# Patient Record
Sex: FEMALE | Race: WHITE | NOT HISPANIC OR LATINO | Employment: UNEMPLOYED | URBAN - METROPOLITAN AREA
[De-identification: names, ages, dates, MRNs, and addresses within clinical notes are randomized per-mention and may not be internally consistent; named-entity substitution may affect disease eponyms.]

---

## 2023-03-10 LAB
CALCIDIOL (25 OH VITAMIN D3) (NG/ML) IN SER/PLAS: 23 NG/ML
COBALAMIN (VITAMIN B12) (PG/ML) IN SER/PLAS: 325 PG/ML (ref 211–911)
THYROTROPIN (MIU/L) IN SER/PLAS BY DETECTION LIMIT <= 0.05 MIU/L: 0.81 MIU/L (ref 0.44–3.98)

## 2023-10-02 RX ORDER — ERGOCALCIFEROL 1.25 MG/1
1 CAPSULE ORAL
Qty: 12 CAPSULE | OUTPATIENT
Start: 2023-10-02

## 2023-10-02 NOTE — TELEPHONE ENCOUNTER
Please call patient and ask her to schedule a follow up with me if she wants the vitamin d refilled. Thanks.

## 2023-10-03 PROBLEM — N92.0 MENORRHAGIA WITH REGULAR CYCLE: Status: ACTIVE | Noted: 2023-10-03

## 2023-10-03 PROBLEM — R23.2 HOT FLASHES: Status: ACTIVE | Noted: 2023-10-03

## 2023-10-03 PROBLEM — S62.638A CLOSED FRACTURE OF DISTAL PHALANX OF LITTLE FINGER: Status: ACTIVE | Noted: 2023-10-03

## 2023-10-03 PROBLEM — M51.369 DDD (DEGENERATIVE DISC DISEASE), LUMBAR: Status: ACTIVE | Noted: 2023-10-03

## 2023-10-03 PROBLEM — E55.9 VITAMIN D DEFICIENCY: Status: ACTIVE | Noted: 2023-10-03

## 2023-10-03 PROBLEM — N93.9 ABNORMAL UTERINE BLEEDING: Status: ACTIVE | Noted: 2023-10-03

## 2023-10-03 PROBLEM — M54.2 PAIN OF CERVICAL SPINE: Status: ACTIVE | Noted: 2023-10-03

## 2023-10-03 PROBLEM — R10.2 FEMALE PELVIC PAIN: Status: ACTIVE | Noted: 2023-10-03

## 2023-10-03 PROBLEM — G47.33 OBSTRUCTIVE SLEEP APNEA: Status: ACTIVE | Noted: 2023-10-03

## 2023-10-03 PROBLEM — M51.36 DDD (DEGENERATIVE DISC DISEASE), LUMBAR: Status: ACTIVE | Noted: 2023-10-03

## 2023-10-03 PROBLEM — R61 CHRONIC NIGHT SWEATS: Status: ACTIVE | Noted: 2023-10-03

## 2023-10-03 PROBLEM — R53.83 FATIGUE: Status: ACTIVE | Noted: 2023-10-03

## 2023-10-03 PROBLEM — M79.7 FIBROMYALGIA: Status: ACTIVE | Noted: 2023-10-03

## 2023-10-03 PROBLEM — N64.4 BREAST PAIN: Status: ACTIVE | Noted: 2023-10-03

## 2023-10-03 PROBLEM — R19.8 RIGHT PELVIC ADNEXAL FLUID COLLECTION: Status: ACTIVE | Noted: 2023-10-03

## 2023-10-03 PROBLEM — M54.50 LOW BACK PAIN: Status: ACTIVE | Noted: 2023-10-03

## 2023-10-03 PROBLEM — N92.6 IRREGULAR MENSTRUAL BLEEDING: Status: ACTIVE | Noted: 2023-10-03

## 2023-10-03 PROBLEM — E53.8 VITAMIN B12 DEFICIENCY: Status: ACTIVE | Noted: 2023-10-03

## 2023-10-03 PROBLEM — D48.5 NEOPLASM OF UNCERTAIN BEHAVIOR OF SKIN: Status: ACTIVE | Noted: 2020-01-14

## 2023-10-03 RX ORDER — FLUOXETINE HYDROCHLORIDE 20 MG/1
CAPSULE ORAL
COMMUNITY
Start: 2020-08-28 | End: 2023-12-12 | Stop reason: WASHOUT

## 2023-10-03 RX ORDER — FLUTICASONE PROPIONATE AND SALMETEROL 50; 250 UG/1; UG/1
POWDER RESPIRATORY (INHALATION)
COMMUNITY
Start: 2023-09-01 | End: 2023-12-12 | Stop reason: WASHOUT

## 2023-10-03 RX ORDER — FAMOTIDINE 40 MG/1
TABLET, FILM COATED ORAL
COMMUNITY
Start: 2019-11-05

## 2023-10-03 RX ORDER — LANOLIN ALCOHOL/MO/W.PET/CERES
CREAM (GRAM) TOPICAL
COMMUNITY
Start: 2023-03-04 | End: 2023-12-12 | Stop reason: WASHOUT

## 2023-10-03 RX ORDER — TERCONAZOLE 4 MG/G
CREAM VAGINAL
COMMUNITY
Start: 2020-08-28 | End: 2023-12-12 | Stop reason: WASHOUT

## 2023-10-03 RX ORDER — GABAPENTIN 300 MG/1
300 CAPSULE ORAL 3 TIMES DAILY
COMMUNITY
End: 2023-12-12 | Stop reason: SDUPTHER

## 2023-10-03 RX ORDER — TOPIRAMATE 100 MG/1
100 TABLET, FILM COATED ORAL 2 TIMES DAILY
COMMUNITY

## 2023-10-03 RX ORDER — INHALER, ASSIST DEVICES
SPACER (EA) MISCELLANEOUS
COMMUNITY
Start: 2022-10-10

## 2023-10-03 RX ORDER — ALBUTEROL SULFATE 90 UG/1
2 AEROSOL, METERED RESPIRATORY (INHALATION) EVERY 4 HOURS PRN
COMMUNITY

## 2023-10-03 RX ORDER — LIDOCAINE 50 MG/G
PATCH TOPICAL
COMMUNITY
Start: 2023-05-18 | End: 2024-04-23 | Stop reason: SDUPTHER

## 2023-10-03 RX ORDER — IBUPROFEN 600 MG/1
600 TABLET ORAL EVERY 8 HOURS
COMMUNITY
Start: 2022-10-12 | End: 2023-11-28 | Stop reason: WASHOUT

## 2023-10-03 RX ORDER — TRIAMTERENE/HYDROCHLOROTHIAZID 37.5-25 MG
1 TABLET ORAL DAILY
COMMUNITY
Start: 2023-08-19 | End: 2023-12-12 | Stop reason: WASHOUT

## 2023-10-03 RX ORDER — ASCORBIC ACID 250 MG
250 TABLET ORAL DAILY
COMMUNITY
Start: 2023-09-21

## 2023-10-03 RX ORDER — POTASSIUM CHLORIDE 750 MG/1
10 TABLET, EXTENDED RELEASE ORAL DAILY
COMMUNITY
Start: 2022-12-09

## 2023-10-03 RX ORDER — LANCETS 30 GAUGE
EACH MISCELLANEOUS
COMMUNITY
Start: 2023-09-08

## 2023-10-03 RX ORDER — LANCETS 33 GAUGE
EACH MISCELLANEOUS
COMMUNITY
Start: 2023-09-08

## 2023-10-03 RX ORDER — ALBUTEROL SULFATE 0.83 MG/ML
SOLUTION RESPIRATORY (INHALATION)
COMMUNITY
Start: 2022-10-11 | End: 2023-12-12 | Stop reason: WASHOUT

## 2023-10-03 RX ORDER — ACETAMINOPHEN 500 MG
TABLET ORAL
COMMUNITY
Start: 2023-08-27

## 2023-10-03 RX ORDER — LIDOCAINE 4 G/100G
PATCH TOPICAL
COMMUNITY
Start: 2023-01-10

## 2023-10-03 RX ORDER — SYRINGE-NEEDLE,INSULIN,0.5 ML 28GX1/2"
600 SYRINGE, EMPTY DISPOSABLE MISCELLANEOUS 2 TIMES DAILY
COMMUNITY

## 2023-10-03 RX ORDER — METFORMIN HYDROCHLORIDE 1000 MG/1
TABLET ORAL
COMMUNITY
Start: 2023-09-26

## 2023-10-03 RX ORDER — SITAGLIPTIN 100 MG/1
100 TABLET, FILM COATED ORAL DAILY
COMMUNITY
Start: 2023-09-19

## 2023-10-03 RX ORDER — CARVEDILOL 12.5 MG/1
TABLET ORAL
COMMUNITY

## 2023-10-03 RX ORDER — BLOOD SUGAR DIAGNOSTIC
STRIP MISCELLANEOUS
COMMUNITY
Start: 2023-09-08

## 2023-10-03 RX ORDER — MAGNESIUM 200 MG
1 TABLET ORAL DAILY
COMMUNITY
Start: 2023-09-30 | End: 2023-11-09

## 2023-10-03 RX ORDER — ASPIRIN 325 MG
TABLET, DELAYED RELEASE (ENTERIC COATED) ORAL
COMMUNITY

## 2023-10-03 RX ORDER — ONDANSETRON 4 MG/1
4 TABLET, ORALLY DISINTEGRATING ORAL EVERY 6 HOURS PRN
COMMUNITY
Start: 2023-08-27

## 2023-10-03 RX ORDER — DICLOFENAC SODIUM 75 MG/1
TABLET, DELAYED RELEASE ORAL
COMMUNITY
Start: 2019-08-12 | End: 2023-11-28 | Stop reason: WASHOUT

## 2023-10-03 RX ORDER — TOPIRAMATE 50 MG/1
TABLET, FILM COATED ORAL
COMMUNITY
End: 2023-12-12 | Stop reason: WASHOUT

## 2023-10-03 RX ORDER — CYCLOBENZAPRINE HCL 10 MG
TABLET ORAL
COMMUNITY
Start: 2023-04-16

## 2023-10-03 RX ORDER — DULOXETIN HYDROCHLORIDE 60 MG/1
CAPSULE, DELAYED RELEASE ORAL
COMMUNITY
Start: 2023-09-21

## 2023-10-03 RX ORDER — METOCLOPRAMIDE 10 MG/1
TABLET ORAL
COMMUNITY
Start: 2023-09-08

## 2023-10-03 RX ORDER — OMEPRAZOLE 40 MG/1
40 CAPSULE, DELAYED RELEASE ORAL 2 TIMES DAILY
COMMUNITY
Start: 2023-01-09

## 2023-10-03 RX ORDER — GLIPIZIDE 5 MG/1
5 TABLET ORAL DAILY
COMMUNITY
End: 2023-12-12 | Stop reason: WASHOUT

## 2023-10-03 RX ORDER — FERROUS SULFATE 325(65) MG
TABLET ORAL
COMMUNITY
Start: 2023-09-19

## 2023-10-03 RX ORDER — ERGOCALCIFEROL 1.25 MG/1
1 CAPSULE ORAL
COMMUNITY
Start: 2023-09-19 | End: 2023-11-09

## 2023-10-03 RX ORDER — TIOTROPIUM BROMIDE 18 UG/1
CAPSULE ORAL; RESPIRATORY (INHALATION)
COMMUNITY
Start: 2023-09-01

## 2023-10-03 RX ORDER — ONDANSETRON 4 MG/1
4 TABLET, FILM COATED ORAL 3 TIMES DAILY
COMMUNITY
Start: 2022-10-12 | End: 2023-12-12 | Stop reason: WASHOUT

## 2023-10-03 RX ORDER — DEXAMETHASONE 6 MG/1
6 TABLET ORAL DAILY
COMMUNITY
Start: 2023-08-30

## 2023-10-03 RX ORDER — PANTOPRAZOLE SODIUM 40 MG/1
40 TABLET, DELAYED RELEASE ORAL DAILY
COMMUNITY
Start: 2023-08-29 | End: 2023-12-12 | Stop reason: WASHOUT

## 2023-10-03 RX ORDER — PREGABALIN 25 MG/1
25 CAPSULE ORAL 3 TIMES DAILY
COMMUNITY
Start: 2023-01-10 | End: 2023-12-12 | Stop reason: WASHOUT

## 2023-10-03 RX ORDER — BENZONATATE 100 MG/1
CAPSULE ORAL
COMMUNITY
End: 2023-12-12 | Stop reason: WASHOUT

## 2023-10-03 RX ORDER — METFORMIN HYDROCHLORIDE 500 MG/1
TABLET ORAL
COMMUNITY
End: 2023-12-12 | Stop reason: WASHOUT

## 2023-10-03 RX ORDER — GABAPENTIN 100 MG/1
CAPSULE ORAL
COMMUNITY
End: 2023-12-12 | Stop reason: WASHOUT

## 2023-10-03 RX ORDER — ACETAMINOPHEN 325 MG/1
TABLET ORAL
COMMUNITY
Start: 2023-03-15 | End: 2023-12-12 | Stop reason: WASHOUT

## 2023-10-03 RX ORDER — FLUOXETINE HYDROCHLORIDE 40 MG/1
CAPSULE ORAL
COMMUNITY
Start: 2023-09-26

## 2023-10-04 ENCOUNTER — APPOINTMENT (OUTPATIENT)
Dept: INFUSION THERAPY | Facility: CLINIC | Age: 43
End: 2023-10-04
Payer: COMMERCIAL

## 2023-10-05 RX ORDER — METOPROLOL TARTRATE 25 MG/1
25 TABLET, FILM COATED ORAL ONCE
Status: CANCELLED | OUTPATIENT
Start: 2023-10-10 | End: 2023-10-10

## 2023-10-05 RX ORDER — EPINEPHRINE 0.3 MG/.3ML
0.3 INJECTION SUBCUTANEOUS EVERY 5 MIN PRN
Status: CANCELLED | OUTPATIENT
Start: 2023-10-10

## 2023-10-05 RX ORDER — NITROGLYCERIN 0.4 MG/1
0.4 TABLET SUBLINGUAL ONCE
Status: CANCELLED | OUTPATIENT
Start: 2023-10-10 | End: 2023-10-10

## 2023-10-05 RX ORDER — ALBUTEROL SULFATE 0.83 MG/ML
3 SOLUTION RESPIRATORY (INHALATION) AS NEEDED
Status: CANCELLED | OUTPATIENT
Start: 2023-10-10

## 2023-10-05 RX ORDER — ONDANSETRON HYDROCHLORIDE 2 MG/ML
4 INJECTION, SOLUTION INTRAVENOUS ONCE
Status: CANCELLED | OUTPATIENT
Start: 2023-10-10 | End: 2023-10-10

## 2023-10-05 RX ORDER — DIPHENHYDRAMINE HYDROCHLORIDE 50 MG/ML
50 INJECTION INTRAMUSCULAR; INTRAVENOUS AS NEEDED
Status: CANCELLED | OUTPATIENT
Start: 2023-10-10

## 2023-10-05 RX ORDER — KETOROLAC TROMETHAMINE 30 MG/ML
30 INJECTION, SOLUTION INTRAMUSCULAR; INTRAVENOUS ONCE
Status: CANCELLED | OUTPATIENT
Start: 2023-10-10 | End: 2023-10-10

## 2023-10-05 RX ORDER — FAMOTIDINE 10 MG/ML
20 INJECTION INTRAVENOUS ONCE AS NEEDED
Status: CANCELLED | OUTPATIENT
Start: 2023-10-10

## 2023-10-10 ENCOUNTER — INFUSION (OUTPATIENT)
Dept: INFUSION THERAPY | Facility: CLINIC | Age: 43
End: 2023-10-10
Payer: COMMERCIAL

## 2023-10-10 VITALS
SYSTOLIC BLOOD PRESSURE: 126 MMHG | RESPIRATION RATE: 16 BRPM | DIASTOLIC BLOOD PRESSURE: 79 MMHG | TEMPERATURE: 97.5 F | HEART RATE: 80 BPM | OXYGEN SATURATION: 97 %

## 2023-10-10 DIAGNOSIS — M79.7 FIBROMYALGIA: Primary | ICD-10-CM

## 2023-10-10 PROCEDURE — 2500000004 HC RX 250 GENERAL PHARMACY W/ HCPCS (ALT 636 FOR OP/ED): Performed by: NURSE PRACTITIONER

## 2023-10-10 PROCEDURE — 96365 THER/PROPH/DIAG IV INF INIT: CPT

## 2023-10-10 PROCEDURE — 96368 THER/DIAG CONCURRENT INF: CPT

## 2023-10-10 PROCEDURE — 2500000005 HC RX 250 GENERAL PHARMACY W/O HCPCS: Performed by: NURSE PRACTITIONER

## 2023-10-10 RX ORDER — ONDANSETRON HYDROCHLORIDE 2 MG/ML
4 INJECTION, SOLUTION INTRAVENOUS ONCE
Status: CANCELLED | OUTPATIENT
Start: 2023-10-24 | End: 2023-10-24

## 2023-10-10 RX ORDER — METOPROLOL TARTRATE 25 MG/1
25 TABLET, FILM COATED ORAL ONCE
Status: CANCELLED | OUTPATIENT
Start: 2023-10-24 | End: 2023-10-24

## 2023-10-10 RX ORDER — KETOROLAC TROMETHAMINE 30 MG/ML
30 INJECTION, SOLUTION INTRAMUSCULAR; INTRAVENOUS ONCE
Status: CANCELLED | OUTPATIENT
Start: 2023-10-24 | End: 2023-10-24

## 2023-10-10 RX ORDER — ALBUTEROL SULFATE 0.83 MG/ML
3 SOLUTION RESPIRATORY (INHALATION) AS NEEDED
Status: CANCELLED | OUTPATIENT
Start: 2023-10-24

## 2023-10-10 RX ORDER — EPINEPHRINE 0.3 MG/.3ML
0.3 INJECTION SUBCUTANEOUS EVERY 5 MIN PRN
Status: CANCELLED | OUTPATIENT
Start: 2023-10-24

## 2023-10-10 RX ORDER — FAMOTIDINE 10 MG/ML
20 INJECTION INTRAVENOUS ONCE AS NEEDED
Status: CANCELLED | OUTPATIENT
Start: 2023-10-24

## 2023-10-10 RX ORDER — NITROGLYCERIN 0.4 MG/1
0.4 TABLET SUBLINGUAL ONCE
Status: CANCELLED | OUTPATIENT
Start: 2023-10-24 | End: 2023-10-24

## 2023-10-10 RX ORDER — DIPHENHYDRAMINE HYDROCHLORIDE 50 MG/ML
50 INJECTION INTRAMUSCULAR; INTRAVENOUS AS NEEDED
Status: CANCELLED | OUTPATIENT
Start: 2023-10-24

## 2023-10-10 RX ADMIN — PROPOFOL 100 MG: 10 INJECTION, EMULSION INTRAVENOUS at 15:23

## 2023-10-10 RX ADMIN — Medication: at 15:23

## 2023-10-10 ASSESSMENT — PATIENT HEALTH QUESTIONNAIRE - PHQ9
10. IF YOU CHECKED OFF ANY PROBLEMS, HOW DIFFICULT HAVE THESE PROBLEMS MADE IT FOR YOU TO DO YOUR WORK, TAKE CARE OF THINGS AT HOME, OR GET ALONG WITH OTHER PEOPLE: VERY DIFFICULT
9. THOUGHTS THAT YOU WOULD BE BETTER OFF DEAD, OR OF HURTING YOURSELF: SEVERAL DAYS
SUM OF ALL RESPONSES TO PHQ QUESTIONS 1-9: 16
SUM OF ALL RESPONSES TO PHQ9 QUESTIONS 1 AND 2: 6
4. FEELING TIRED OR HAVING LITTLE ENERGY: SEVERAL DAYS
7. TROUBLE CONCENTRATING ON THINGS, SUCH AS READING THE NEWSPAPER OR WATCHING TELEVISION: NEARLY EVERY DAY
2. FEELING DOWN, DEPRESSED OR HOPELESS: NEARLY EVERY DAY
8. MOVING OR SPEAKING SO SLOWLY THAT OTHER PEOPLE COULD HAVE NOTICED. OR THE OPPOSITE, BEING SO FIGETY OR RESTLESS THAT YOU HAVE BEEN MOVING AROUND A LOT MORE THAN USUAL: SEVERAL DAYS
5. POOR APPETITE OR OVEREATING: SEVERAL DAYS
1. LITTLE INTEREST OR PLEASURE IN DOING THINGS: NEARLY EVERY DAY
6. FEELING BAD ABOUT YOURSELF - OR THAT YOU ARE A FAILURE OR HAVE LET YOURSELF OR YOUR FAMILY DOWN: MORE THAN HALF THE DAYS
3. TROUBLE FALLING OR STAYING ASLEEP OR SLEEPING TOO MUCH: SEVERAL DAYS

## 2023-10-10 ASSESSMENT — COLUMBIA-SUICIDE SEVERITY RATING SCALE - C-SSRS
6. HAVE YOU EVER DONE ANYTHING, STARTED TO DO ANYTHING, OR PREPARED TO DO ANYTHING TO END YOUR LIFE?: YES
1. IN THE PAST MONTH, HAVE YOU WISHED YOU WERE DEAD OR WISHED YOU COULD GO TO SLEEP AND NOT WAKE UP?: YES
6. HAVE YOU EVER DONE ANYTHING, STARTED TO DO ANYTHING, OR PREPARED TO DO ANYTHING TO END YOUR LIFE?: NO
2. HAVE YOU ACTUALLY HAD ANY THOUGHTS OF KILLING YOURSELF?: NO

## 2023-10-10 ASSESSMENT — PAIN - FUNCTIONAL ASSESSMENT: PAIN_FUNCTIONAL_ASSESSMENT: 0-10

## 2023-10-10 ASSESSMENT — PAIN SCALES - GENERAL
PAINLEVEL_OUTOF10: 0 - NO PAIN
PAINLEVEL_OUTOF10: 8

## 2023-10-10 NOTE — PROGRESS NOTES
S: Patient here for 8th opioid sparing pain infusion. Patient reports 80% reduction in pain after last infusion that lasted 24 hours.    Purpose of pain infusion meds explained along with potential side effects.  Patient verbalized understanding.    B: Pain Issues    A: Patient currently has pain described on flow sheet documentation. Designated  is significant other.     R: Plan; Obtain IV access, do patient risk assessment, and start opioid sparing infusion as ordered. Monitoring for S/S of adverse reactions.    1605- Post infusion teaching provided via handout and verbal instruction. Patient verbalized understanding. VSS, Patient states pain is 0. Will assist patient to waiting car via wheelchair.

## 2023-10-23 ENCOUNTER — APPOINTMENT (OUTPATIENT)
Dept: INFUSION THERAPY | Facility: CLINIC | Age: 43
End: 2023-10-23

## 2023-10-27 RX ORDER — ALBUTEROL SULFATE 0.83 MG/ML
3 SOLUTION RESPIRATORY (INHALATION) AS NEEDED
Status: CANCELLED | OUTPATIENT
Start: 2023-10-27

## 2023-10-27 RX ORDER — METOPROLOL TARTRATE 25 MG/1
25 TABLET, FILM COATED ORAL ONCE
Status: CANCELLED | OUTPATIENT
Start: 2023-10-27 | End: 2023-10-27

## 2023-10-27 RX ORDER — NITROGLYCERIN 0.4 MG/1
0.4 TABLET SUBLINGUAL ONCE
Status: CANCELLED | OUTPATIENT
Start: 2023-10-27 | End: 2023-10-27

## 2023-10-27 RX ORDER — DIPHENHYDRAMINE HYDROCHLORIDE 50 MG/ML
50 INJECTION INTRAMUSCULAR; INTRAVENOUS AS NEEDED
Status: CANCELLED | OUTPATIENT
Start: 2023-10-27

## 2023-10-27 RX ORDER — KETOROLAC TROMETHAMINE 30 MG/ML
30 INJECTION, SOLUTION INTRAMUSCULAR; INTRAVENOUS ONCE
Status: CANCELLED | OUTPATIENT
Start: 2023-10-27 | End: 2023-10-27

## 2023-10-27 RX ORDER — ONDANSETRON HYDROCHLORIDE 2 MG/ML
4 INJECTION, SOLUTION INTRAVENOUS ONCE
Status: CANCELLED | OUTPATIENT
Start: 2023-10-27 | End: 2023-10-27

## 2023-10-27 RX ORDER — FAMOTIDINE 10 MG/ML
20 INJECTION INTRAVENOUS ONCE AS NEEDED
Status: CANCELLED | OUTPATIENT
Start: 2023-10-27

## 2023-10-27 RX ORDER — EPINEPHRINE 0.3 MG/.3ML
0.3 INJECTION SUBCUTANEOUS EVERY 5 MIN PRN
Status: CANCELLED | OUTPATIENT
Start: 2023-10-27

## 2023-10-29 RX ORDER — HEPARIN 100 UNIT/ML
500 SYRINGE INTRAVENOUS AS NEEDED
OUTPATIENT
Start: 2023-10-30

## 2023-10-29 RX ORDER — HEPARIN SODIUM,PORCINE/PF 10 UNIT/ML
50 SYRINGE (ML) INTRAVENOUS AS NEEDED
OUTPATIENT
Start: 2023-10-30

## 2023-10-30 ENCOUNTER — INFUSION (OUTPATIENT)
Dept: INFUSION THERAPY | Facility: CLINIC | Age: 43
End: 2023-10-30
Payer: COMMERCIAL

## 2023-10-30 VITALS
OXYGEN SATURATION: 99 % | TEMPERATURE: 97.5 F | SYSTOLIC BLOOD PRESSURE: 120 MMHG | RESPIRATION RATE: 15 BRPM | HEART RATE: 81 BPM | DIASTOLIC BLOOD PRESSURE: 75 MMHG

## 2023-10-30 DIAGNOSIS — M79.7 FIBROMYALGIA: ICD-10-CM

## 2023-10-30 PROCEDURE — 96365 THER/PROPH/DIAG IV INF INIT: CPT | Performed by: ANESTHESIOLOGY

## 2023-10-30 PROCEDURE — 96375 TX/PRO/DX INJ NEW DRUG ADDON: CPT | Performed by: ANESTHESIOLOGY

## 2023-10-30 PROCEDURE — 2500000004 HC RX 250 GENERAL PHARMACY W/ HCPCS (ALT 636 FOR OP/ED): Performed by: ANESTHESIOLOGY

## 2023-10-30 PROCEDURE — 2500000005 HC RX 250 GENERAL PHARMACY W/O HCPCS: Performed by: ANESTHESIOLOGY

## 2023-10-30 RX ORDER — DIPHENHYDRAMINE HYDROCHLORIDE 50 MG/ML
50 INJECTION INTRAMUSCULAR; INTRAVENOUS AS NEEDED
Status: CANCELLED | OUTPATIENT
Start: 2023-11-12

## 2023-10-30 RX ORDER — KETOROLAC TROMETHAMINE 30 MG/ML
30 INJECTION, SOLUTION INTRAMUSCULAR; INTRAVENOUS ONCE
Status: CANCELLED | OUTPATIENT
Start: 2023-11-12 | End: 2023-11-12

## 2023-10-30 RX ORDER — NITROGLYCERIN 0.4 MG/1
0.4 TABLET SUBLINGUAL ONCE
Status: DISCONTINUED | OUTPATIENT
Start: 2023-10-30 | End: 2023-10-30 | Stop reason: HOSPADM

## 2023-10-30 RX ORDER — METOPROLOL TARTRATE 25 MG/1
25 TABLET, FILM COATED ORAL ONCE
Status: DISCONTINUED | OUTPATIENT
Start: 2023-10-30 | End: 2023-10-30 | Stop reason: HOSPADM

## 2023-10-30 RX ORDER — DIPHENHYDRAMINE HYDROCHLORIDE 50 MG/ML
50 INJECTION INTRAMUSCULAR; INTRAVENOUS AS NEEDED
Status: DISCONTINUED | OUTPATIENT
Start: 2023-10-30 | End: 2023-10-30 | Stop reason: HOSPADM

## 2023-10-30 RX ORDER — FAMOTIDINE 10 MG/ML
20 INJECTION INTRAVENOUS ONCE AS NEEDED
Status: CANCELLED | OUTPATIENT
Start: 2023-11-12

## 2023-10-30 RX ORDER — KETOROLAC TROMETHAMINE 30 MG/ML
30 INJECTION, SOLUTION INTRAMUSCULAR; INTRAVENOUS ONCE
Status: DISCONTINUED | OUTPATIENT
Start: 2023-10-30 | End: 2023-10-30

## 2023-10-30 RX ORDER — NITROGLYCERIN 0.4 MG/1
0.4 TABLET SUBLINGUAL ONCE
Status: CANCELLED | OUTPATIENT
Start: 2023-11-12 | End: 2023-11-12

## 2023-10-30 RX ORDER — METOPROLOL TARTRATE 25 MG/1
25 TABLET, FILM COATED ORAL ONCE
Status: CANCELLED | OUTPATIENT
Start: 2023-11-12 | End: 2023-11-12

## 2023-10-30 RX ORDER — FAMOTIDINE 10 MG/ML
20 INJECTION INTRAVENOUS ONCE AS NEEDED
Status: DISCONTINUED | OUTPATIENT
Start: 2023-10-30 | End: 2023-10-30 | Stop reason: HOSPADM

## 2023-10-30 RX ORDER — ONDANSETRON HYDROCHLORIDE 2 MG/ML
4 INJECTION, SOLUTION INTRAVENOUS ONCE
Status: DISCONTINUED | OUTPATIENT
Start: 2023-10-30 | End: 2023-10-30 | Stop reason: HOSPADM

## 2023-10-30 RX ORDER — EPINEPHRINE 0.3 MG/.3ML
0.3 INJECTION SUBCUTANEOUS EVERY 5 MIN PRN
Status: DISCONTINUED | OUTPATIENT
Start: 2023-10-30 | End: 2023-10-30 | Stop reason: HOSPADM

## 2023-10-30 RX ORDER — EPINEPHRINE 0.3 MG/.3ML
0.3 INJECTION SUBCUTANEOUS EVERY 5 MIN PRN
Status: CANCELLED | OUTPATIENT
Start: 2023-11-12

## 2023-10-30 RX ORDER — ALBUTEROL SULFATE 0.83 MG/ML
3 SOLUTION RESPIRATORY (INHALATION) AS NEEDED
Status: CANCELLED | OUTPATIENT
Start: 2023-11-12

## 2023-10-30 RX ORDER — ONDANSETRON HYDROCHLORIDE 2 MG/ML
4 INJECTION, SOLUTION INTRAVENOUS ONCE
Status: CANCELLED | OUTPATIENT
Start: 2023-11-12 | End: 2023-11-12

## 2023-10-30 RX ORDER — ALBUTEROL SULFATE 0.83 MG/ML
3 SOLUTION RESPIRATORY (INHALATION) AS NEEDED
Status: DISCONTINUED | OUTPATIENT
Start: 2023-10-30 | End: 2023-10-30 | Stop reason: HOSPADM

## 2023-10-30 RX ADMIN — Medication: at 13:30

## 2023-10-30 RX ADMIN — PROPOFOL 100 MG: 10 INJECTION, EMULSION INTRAVENOUS at 13:29

## 2023-10-30 SDOH — ECONOMIC STABILITY: FOOD INSECURITY: WITHIN THE PAST 12 MONTHS, YOU WORRIED THAT YOUR FOOD WOULD RUN OUT BEFORE YOU GOT MONEY TO BUY MORE.: NEVER TRUE

## 2023-10-30 SDOH — ECONOMIC STABILITY: FOOD INSECURITY: WITHIN THE PAST 12 MONTHS, THE FOOD YOU BOUGHT JUST DIDN'T LAST AND YOU DIDN'T HAVE MONEY TO GET MORE.: NEVER TRUE

## 2023-10-30 ASSESSMENT — LIFESTYLE VARIABLES: HOW OFTEN DO YOU HAVE A DRINK CONTAINING ALCOHOL: NEVER

## 2023-10-30 ASSESSMENT — PAIN - FUNCTIONAL ASSESSMENT
PAIN_FUNCTIONAL_ASSESSMENT: 0-10
PAIN_FUNCTIONAL_ASSESSMENT: 0-10

## 2023-10-30 ASSESSMENT — COLUMBIA-SUICIDE SEVERITY RATING SCALE - C-SSRS
2. HAVE YOU ACTUALLY HAD ANY THOUGHTS OF KILLING YOURSELF?: NO
1. IN THE PAST MONTH, HAVE YOU WISHED YOU WERE DEAD OR WISHED YOU COULD GO TO SLEEP AND NOT WAKE UP?: NO
6. HAVE YOU EVER DONE ANYTHING, STARTED TO DO ANYTHING, OR PREPARED TO DO ANYTHING TO END YOUR LIFE?: NO
6. HAVE YOU EVER DONE ANYTHING, STARTED TO DO ANYTHING, OR PREPARED TO DO ANYTHING TO END YOUR LIFE?: NO

## 2023-10-30 ASSESSMENT — PATIENT HEALTH QUESTIONNAIRE - PHQ9
2. FEELING DOWN, DEPRESSED OR HOPELESS: SEVERAL DAYS
5. POOR APPETITE OR OVEREATING: SEVERAL DAYS
3. TROUBLE FALLING OR STAYING ASLEEP OR SLEEPING TOO MUCH: SEVERAL DAYS
6. FEELING BAD ABOUT YOURSELF - OR THAT YOU ARE A FAILURE OR HAVE LET YOURSELF OR YOUR FAMILY DOWN: SEVERAL DAYS
SUM OF ALL RESPONSES TO PHQ9 QUESTIONS 1 AND 2: 2
8. MOVING OR SPEAKING SO SLOWLY THAT OTHER PEOPLE COULD HAVE NOTICED. OR THE OPPOSITE, BEING SO FIGETY OR RESTLESS THAT YOU HAVE BEEN MOVING AROUND A LOT MORE THAN USUAL: SEVERAL DAYS
4. FEELING TIRED OR HAVING LITTLE ENERGY: SEVERAL DAYS
1. LITTLE INTEREST OR PLEASURE IN DOING THINGS: SEVERAL DAYS
9. THOUGHTS THAT YOU WOULD BE BETTER OFF DEAD, OR OF HURTING YOURSELF: SEVERAL DAYS
SUM OF ALL RESPONSES TO PHQ QUESTIONS 1-9: 9
7. TROUBLE CONCENTRATING ON THINGS, SUCH AS READING THE NEWSPAPER OR WATCHING TELEVISION: SEVERAL DAYS

## 2023-10-30 ASSESSMENT — PAIN SCALES - GENERAL
PAINLEVEL_OUTOF10: 0 - NO PAIN
PAINLEVEL_OUTOF10: 0 - NO PAIN
PAINLEVEL_OUTOF10: 8
PAINLEVEL: 8
PAINLEVEL_OUTOF10: 8

## 2023-10-30 ASSESSMENT — ENCOUNTER SYMPTOMS
OCCASIONAL FEELINGS OF UNSTEADINESS: 0
LOSS OF SENSATION IN FEET: 0
DEPRESSION: 0

## 2023-10-30 NOTE — PROGRESS NOTES
S: Patient here for 10 opioid sparing pain infusion. Patient reports 80% reduction in pain after last infusion that lasted 24 hours.    Purpose of pain infusion meds explained along with potential side effects.  Patient verbalized understanding.    B: Pain Issues 8/10 low right back    A: Patient currently has pain described on flow sheet documentation. Designated  is [employer]. Patient last ate solid food 4 hours ago, and had liquid 1 hours ago.    R: Plan; Obtain IV access, do patient risk assessment, and start opioid sparing infusion as ordered. Monitoring for S/S of adverse reactions.    Patient resting up in chair, no distress noted.    Post infusion teaching provided via handout and verbal instruction. Patient verbalized understanding. VSS, Patient states pain is 0. Will assist patient to waiting car via wheelchair.      Patient tolerated well.

## 2023-11-07 DIAGNOSIS — E53.8 VITAMIN B12 DEFICIENCY: Primary | ICD-10-CM

## 2023-11-07 DIAGNOSIS — E55.9 VITAMIN D DEFICIENCY: ICD-10-CM

## 2023-11-09 RX ORDER — MAGNESIUM 200 MG
TABLET ORAL
Qty: 90 TABLET | Refills: 0 | Status: SHIPPED | OUTPATIENT
Start: 2023-11-09 | End: 2023-11-28 | Stop reason: SDUPTHER

## 2023-11-09 RX ORDER — ERGOCALCIFEROL 1.25 MG/1
1 CAPSULE ORAL
Qty: 12 CAPSULE | Refills: 0 | Status: SHIPPED | OUTPATIENT
Start: 2023-11-09 | End: 2023-11-28 | Stop reason: WASHOUT

## 2023-11-13 RX ORDER — ONDANSETRON HYDROCHLORIDE 2 MG/ML
4 INJECTION, SOLUTION INTRAVENOUS ONCE
Status: CANCELLED | OUTPATIENT
Start: 2023-11-14 | End: 2023-11-14

## 2023-11-13 RX ORDER — EPINEPHRINE 0.3 MG/.3ML
0.3 INJECTION SUBCUTANEOUS EVERY 5 MIN PRN
Status: CANCELLED | OUTPATIENT
Start: 2023-11-14

## 2023-11-13 RX ORDER — METOPROLOL TARTRATE 25 MG/1
25 TABLET, FILM COATED ORAL ONCE
Status: CANCELLED | OUTPATIENT
Start: 2023-11-14 | End: 2023-11-14

## 2023-11-13 RX ORDER — ALBUTEROL SULFATE 0.83 MG/ML
3 SOLUTION RESPIRATORY (INHALATION) AS NEEDED
Status: CANCELLED | OUTPATIENT
Start: 2023-11-14

## 2023-11-13 RX ORDER — DIPHENHYDRAMINE HYDROCHLORIDE 50 MG/ML
50 INJECTION INTRAMUSCULAR; INTRAVENOUS AS NEEDED
Status: CANCELLED | OUTPATIENT
Start: 2023-11-14

## 2023-11-13 RX ORDER — FAMOTIDINE 10 MG/ML
20 INJECTION INTRAVENOUS ONCE AS NEEDED
Status: CANCELLED | OUTPATIENT
Start: 2023-11-14

## 2023-11-13 RX ORDER — KETOROLAC TROMETHAMINE 30 MG/ML
30 INJECTION, SOLUTION INTRAMUSCULAR; INTRAVENOUS ONCE
Status: CANCELLED | OUTPATIENT
Start: 2023-11-14 | End: 2023-11-14

## 2023-11-13 RX ORDER — NITROGLYCERIN 0.4 MG/1
0.4 TABLET SUBLINGUAL ONCE
Status: CANCELLED | OUTPATIENT
Start: 2023-11-14 | End: 2023-11-14

## 2023-11-14 ENCOUNTER — APPOINTMENT (OUTPATIENT)
Dept: INFUSION THERAPY | Facility: CLINIC | Age: 43
End: 2023-11-14
Payer: COMMERCIAL

## 2023-11-28 ENCOUNTER — TELEMEDICINE (OUTPATIENT)
Dept: INTEGRATIVE MEDICINE | Facility: CLINIC | Age: 43
End: 2023-11-28
Payer: COMMERCIAL

## 2023-11-28 VITALS — BODY MASS INDEX: 38.57 KG/M2 | HEIGHT: 66 IN | WEIGHT: 240 LBS

## 2023-11-28 DIAGNOSIS — M54.41 CHRONIC BILATERAL LOW BACK PAIN WITH BILATERAL SCIATICA: ICD-10-CM

## 2023-11-28 DIAGNOSIS — E53.8 VITAMIN B12 DEFICIENCY: ICD-10-CM

## 2023-11-28 DIAGNOSIS — G89.29 CHRONIC BILATERAL LOW BACK PAIN WITH BILATERAL SCIATICA: ICD-10-CM

## 2023-11-28 DIAGNOSIS — E55.9 VITAMIN D DEFICIENCY: Primary | ICD-10-CM

## 2023-11-28 DIAGNOSIS — N92.0 MENORRHAGIA WITH REGULAR CYCLE: ICD-10-CM

## 2023-11-28 DIAGNOSIS — R53.82 CHRONIC FATIGUE: ICD-10-CM

## 2023-11-28 DIAGNOSIS — G35 MULTIPLE SCLEROSIS (MULTI): ICD-10-CM

## 2023-11-28 DIAGNOSIS — M54.42 CHRONIC BILATERAL LOW BACK PAIN WITH BILATERAL SCIATICA: ICD-10-CM

## 2023-11-28 PROCEDURE — 99215 OFFICE O/P EST HI 40 MIN: CPT | Performed by: INTERNAL MEDICINE

## 2023-11-28 RX ORDER — ERGOCALCIFEROL 1.25 MG/1
50000 CAPSULE ORAL
Qty: 12 CAPSULE | Refills: 0 | Status: SHIPPED | OUTPATIENT
Start: 2023-11-28 | End: 2024-02-20

## 2023-11-28 RX ORDER — MAGNESIUM 200 MG
1000 TABLET ORAL DAILY
Qty: 90 TABLET | Refills: 0 | Status: SHIPPED | OUTPATIENT
Start: 2023-11-28

## 2023-11-28 ASSESSMENT — ENCOUNTER SYMPTOMS
WEAKNESS: 0
SHORTNESS OF BREATH: 0
FATIGUE: 0
ARTHRALGIAS: 0
COUGH: 0
HEADACHES: 0
FEVER: 0
MYALGIAS: 0
NERVOUS/ANXIOUS: 0
BACK PAIN: 1
SLEEP DISTURBANCE: 0
DYSURIA: 0
DIFFICULTY URINATING: 0
APPETITE CHANGE: 0

## 2023-11-28 NOTE — ASSESSMENT & PLAN NOTE
Has iron deficiency and not on iron. Suggest start iron.   Suggest follow up with pcp about this.

## 2023-11-28 NOTE — PATIENT INSTRUCTIONS
Restart vitamin d and vitamin b12.  Restart iron supplements and see pcp in next couple months to review how long to be on these given your iron deficiency.   Start physical therapy.  Follow up in two months.   Get blood work in one month for vitamin d and vitamin b12.   Junie Tobar MD PhD

## 2023-11-28 NOTE — PROGRESS NOTES
"Integrative Medicine Follow-up Visit :     Subjective   Patient ID: Iraida La is a 43 y.o. female who presents for Anemia and Back Pain       HPI  Back pain: hurts to walk.  Leg weakness. She did not go to PT as we discussed lately.   Is anemic. She was on iron supplements but no one prescribed refills.       Hx of multiple sclerosis.  Has some narrowing of her spine per patient but she does not know exactly why she has low back pain. Maybe a slipped disk.     Is using cpap to help with her difficulty sleeping.    Was on doxycycline for stomach issues but off this now.   Pain:9/10 pain today. Located in her low back.     Review of Systems   Constitutional:  Negative for appetite change, fatigue and fever.   HENT:  Negative for congestion and hearing loss.    Eyes:  Negative for visual disturbance.   Respiratory:  Negative for cough and shortness of breath.    Cardiovascular:  Negative for chest pain and leg swelling.   Genitourinary:  Positive for menstrual problem (heavy and painful periods). Negative for difficulty urinating, dysuria and urgency.   Musculoskeletal:  Positive for back pain. Negative for arthralgias and myalgias.   Skin:  Negative for rash.   Neurological:  Negative for weakness and headaches.   Psychiatric/Behavioral:  Negative for behavioral problems and sleep disturbance. The patient is not nervous/anxious.                   Objective   Ht 1.676 m (5' 6\")   Wt 109 kg (240 lb)   BMI 38.74 kg/m²     Physical Exam  Constitutional:       General: She is not in acute distress.     Appearance: She is not ill-appearing, toxic-appearing or diaphoretic.   Pulmonary:      Effort: Pulmonary effort is normal. No respiratory distress.   Musculoskeletal:         General: No deformity.      Cervical back: Normal range of motion.      Comments: Upper extremities normal range of motion grossly   Skin:     Coloration: Skin is not jaundiced or pale.      Findings: No rash.   Psychiatric:         Mood and " Affect: Mood normal.         Behavior: Behavior normal.                      Assessment/Plan     Problem List Items Addressed This Visit             ICD-10-CM    Low back pain M54.50     Recommend PT. Referral entered again.            Relevant Orders    Referral to Physical Therapy    Menorrhagia with regular cycle N92.0     Has iron deficiency and not on iron. Suggest start iron.   Suggest follow up with pcp about this.            Fatigue R53.83     Likely due to iron deficiency         Vitamin B12 deficiency E53.8     Continue b12 supplement.          Relevant Medications    cyanocobalamin, vitamin B-12, 1,000 mcg tablet, sublingual    Other Relevant Orders    Vitamin B12    Vitamin D deficiency - Primary E55.9     Continue supplementaiton         Relevant Medications    ergocalciferol (Vitamin D-2) 1.25 MG (41553 UT) capsule    Other Relevant Orders    Vitamin D 25-Hydroxy,Total (for eval of Vitamin D levels)     Other Visit Diagnoses         Codes    Multiple sclerosis (CMS/HCC)     G35    Relevant Orders    Referral to Physical Therapy              Recommend Follow up in : 3 months    Junie Tobar MD PhD    Time Spent  Prep time on day of patient encounter: 5 minutes  Time spent directly with patient, family or caregiver: 30 minutes  Additional Time Spent on Patient Care Activities: 0 minutes  Documentation Time: 7 minutes  Other Time Spent: 0 minutes  Total: 42 minutes

## 2023-11-30 RX ORDER — PROMETHAZINE HYDROCHLORIDE 25 MG/ML
12.5 INJECTION, SOLUTION INTRAMUSCULAR; INTRAVENOUS ONCE
Status: CANCELLED | OUTPATIENT
Start: 2023-12-05 | End: 2023-12-05

## 2023-11-30 RX ORDER — ALBUTEROL SULFATE 0.83 MG/ML
3 SOLUTION RESPIRATORY (INHALATION) AS NEEDED
Status: CANCELLED | OUTPATIENT
Start: 2023-12-05

## 2023-11-30 RX ORDER — DIPHENHYDRAMINE HYDROCHLORIDE 50 MG/ML
25 INJECTION INTRAMUSCULAR; INTRAVENOUS ONCE
Status: CANCELLED | OUTPATIENT
Start: 2023-12-05 | End: 2023-12-05

## 2023-11-30 RX ORDER — EPINEPHRINE 0.3 MG/.3ML
0.3 INJECTION SUBCUTANEOUS EVERY 5 MIN PRN
Status: CANCELLED | OUTPATIENT
Start: 2023-12-05

## 2023-11-30 RX ORDER — FAMOTIDINE 10 MG/ML
20 INJECTION INTRAVENOUS ONCE AS NEEDED
Status: CANCELLED | OUTPATIENT
Start: 2023-12-05

## 2023-11-30 RX ORDER — METOCLOPRAMIDE HYDROCHLORIDE 5 MG/ML
10 INJECTION INTRAMUSCULAR; INTRAVENOUS ONCE
Status: CANCELLED | OUTPATIENT
Start: 2023-12-05 | End: 2023-12-05

## 2023-11-30 RX ORDER — NITROGLYCERIN 0.4 MG/1
0.4 TABLET SUBLINGUAL ONCE
Status: CANCELLED | OUTPATIENT
Start: 2023-12-05 | End: 2023-12-05

## 2023-11-30 RX ORDER — METOPROLOL TARTRATE 25 MG/1
25 TABLET, FILM COATED ORAL ONCE
Status: CANCELLED | OUTPATIENT
Start: 2023-12-05 | End: 2023-12-05

## 2023-11-30 RX ORDER — DIPHENHYDRAMINE HYDROCHLORIDE 50 MG/ML
50 INJECTION INTRAMUSCULAR; INTRAVENOUS AS NEEDED
Status: CANCELLED | OUTPATIENT
Start: 2023-12-05

## 2023-11-30 RX ORDER — ONDANSETRON HYDROCHLORIDE 2 MG/ML
4 INJECTION, SOLUTION INTRAVENOUS ONCE
Status: CANCELLED | OUTPATIENT
Start: 2023-12-05 | End: 2023-12-05

## 2023-12-05 ENCOUNTER — INFUSION (OUTPATIENT)
Dept: INFUSION THERAPY | Facility: CLINIC | Age: 43
End: 2023-12-05
Payer: COMMERCIAL

## 2023-12-05 VITALS
RESPIRATION RATE: 14 BRPM | TEMPERATURE: 97.2 F | OXYGEN SATURATION: 100 % | SYSTOLIC BLOOD PRESSURE: 115 MMHG | HEART RATE: 77 BPM | DIASTOLIC BLOOD PRESSURE: 78 MMHG

## 2023-12-05 DIAGNOSIS — M79.7 FIBROMYALGIA: Primary | ICD-10-CM

## 2023-12-05 PROCEDURE — 96368 THER/DIAG CONCURRENT INF: CPT | Mod: INF

## 2023-12-05 PROCEDURE — 96365 THER/PROPH/DIAG IV INF INIT: CPT | Mod: INF

## 2023-12-05 PROCEDURE — 2500000005 HC RX 250 GENERAL PHARMACY W/O HCPCS: Performed by: NURSE PRACTITIONER

## 2023-12-05 PROCEDURE — 2500000004 HC RX 250 GENERAL PHARMACY W/ HCPCS (ALT 636 FOR OP/ED): Performed by: NURSE PRACTITIONER

## 2023-12-05 RX ORDER — METOCLOPRAMIDE HYDROCHLORIDE 5 MG/ML
10 INJECTION INTRAMUSCULAR; INTRAVENOUS ONCE
Status: CANCELLED | OUTPATIENT
Start: 2023-12-26 | End: 2023-12-26

## 2023-12-05 RX ORDER — ONDANSETRON HYDROCHLORIDE 2 MG/ML
4 INJECTION, SOLUTION INTRAVENOUS ONCE
Status: CANCELLED | OUTPATIENT
Start: 2023-12-26 | End: 2023-12-26

## 2023-12-05 RX ORDER — ALBUTEROL SULFATE 0.83 MG/ML
3 SOLUTION RESPIRATORY (INHALATION) AS NEEDED
Status: CANCELLED | OUTPATIENT
Start: 2023-12-26

## 2023-12-05 RX ORDER — DULAGLUTIDE 1.5 MG/.5ML
1.5 INJECTION, SOLUTION SUBCUTANEOUS
COMMUNITY

## 2023-12-05 RX ORDER — DIPHENHYDRAMINE HYDROCHLORIDE 50 MG/ML
25 INJECTION INTRAMUSCULAR; INTRAVENOUS ONCE
Status: CANCELLED | OUTPATIENT
Start: 2023-12-26 | End: 2023-12-26

## 2023-12-05 RX ORDER — METOPROLOL TARTRATE 25 MG/1
25 TABLET, FILM COATED ORAL ONCE
Status: CANCELLED | OUTPATIENT
Start: 2023-12-26 | End: 2023-12-26

## 2023-12-05 RX ORDER — DIPHENHYDRAMINE HYDROCHLORIDE 50 MG/ML
50 INJECTION INTRAMUSCULAR; INTRAVENOUS AS NEEDED
Status: CANCELLED | OUTPATIENT
Start: 2023-12-26

## 2023-12-05 RX ORDER — FAMOTIDINE 10 MG/ML
20 INJECTION INTRAVENOUS ONCE AS NEEDED
Status: CANCELLED | OUTPATIENT
Start: 2023-12-26

## 2023-12-05 RX ORDER — PROMETHAZINE HYDROCHLORIDE 25 MG/ML
12.5 INJECTION, SOLUTION INTRAMUSCULAR; INTRAVENOUS ONCE
Status: CANCELLED | OUTPATIENT
Start: 2023-12-26 | End: 2023-12-26

## 2023-12-05 RX ORDER — NITROGLYCERIN 0.4 MG/1
0.4 TABLET SUBLINGUAL ONCE
Status: CANCELLED | OUTPATIENT
Start: 2023-12-26 | End: 2023-12-26

## 2023-12-05 RX ORDER — EPINEPHRINE 0.3 MG/.3ML
0.3 INJECTION SUBCUTANEOUS EVERY 5 MIN PRN
Status: CANCELLED | OUTPATIENT
Start: 2023-12-26

## 2023-12-05 RX ADMIN — PROPOFOL 100 MG: 10 INJECTION, EMULSION INTRAVENOUS at 15:15

## 2023-12-05 RX ADMIN — Medication: at 15:15

## 2023-12-05 ASSESSMENT — PAIN SCALES - GENERAL
PAINLEVEL_OUTOF10: 7
PAINLEVEL_OUTOF10: 7
PAINLEVEL_OUTOF10: 0 - NO PAIN
PAINLEVEL_OUTOF10: 0 - NO PAIN
PAINLEVEL: 7

## 2023-12-05 ASSESSMENT — PAIN - FUNCTIONAL ASSESSMENT: PAIN_FUNCTIONAL_ASSESSMENT: 0-10

## 2023-12-05 NOTE — PROGRESS NOTES
Pt is here for her 10th pain infusion. Pt currently has 7/10 pain in her R lower back.  Pt reports 90% relief after previous infusion that lasted 1 day.  Pt's  is her fiance.  Pt had a light breakfast and fluids throughout the day.

## 2023-12-12 ENCOUNTER — OFFICE VISIT (OUTPATIENT)
Dept: PAIN MEDICINE | Facility: CLINIC | Age: 43
End: 2023-12-12
Payer: COMMERCIAL

## 2023-12-12 VITALS
OXYGEN SATURATION: 100 % | HEART RATE: 89 BPM | TEMPERATURE: 96.1 F | BODY MASS INDEX: 39.86 KG/M2 | DIASTOLIC BLOOD PRESSURE: 77 MMHG | RESPIRATION RATE: 20 BRPM | HEIGHT: 66 IN | SYSTOLIC BLOOD PRESSURE: 110 MMHG | WEIGHT: 248 LBS

## 2023-12-12 DIAGNOSIS — G89.29 CHRONIC BILATERAL LOW BACK PAIN WITH BILATERAL SCIATICA: ICD-10-CM

## 2023-12-12 DIAGNOSIS — M54.42 CHRONIC BILATERAL LOW BACK PAIN WITH BILATERAL SCIATICA: ICD-10-CM

## 2023-12-12 DIAGNOSIS — M54.41 CHRONIC BILATERAL LOW BACK PAIN WITH BILATERAL SCIATICA: ICD-10-CM

## 2023-12-12 DIAGNOSIS — M79.7 FIBROMYALGIA: Primary | ICD-10-CM

## 2023-12-12 PROCEDURE — 99214 OFFICE O/P EST MOD 30 MIN: CPT | Mod: ZK | Performed by: NURSE PRACTITIONER

## 2023-12-12 PROCEDURE — 99214 OFFICE O/P EST MOD 30 MIN: CPT | Performed by: NURSE PRACTITIONER

## 2023-12-12 PROCEDURE — 1036F TOBACCO NON-USER: CPT | Performed by: NURSE PRACTITIONER

## 2023-12-12 RX ORDER — FAMOTIDINE 10 MG/ML
20 INJECTION INTRAVENOUS ONCE AS NEEDED
Status: CANCELLED | OUTPATIENT
Start: 2023-12-19

## 2023-12-12 RX ORDER — PROMETHAZINE HYDROCHLORIDE 25 MG/ML
12.5 INJECTION, SOLUTION INTRAMUSCULAR; INTRAVENOUS ONCE
Status: CANCELLED | OUTPATIENT
Start: 2023-12-19 | End: 2023-12-19

## 2023-12-12 RX ORDER — DIPHENHYDRAMINE HYDROCHLORIDE 50 MG/ML
25 INJECTION INTRAMUSCULAR; INTRAVENOUS ONCE
Status: CANCELLED | OUTPATIENT
Start: 2023-12-19 | End: 2023-12-19

## 2023-12-12 RX ORDER — GABAPENTIN 300 MG/1
300 CAPSULE ORAL 3 TIMES DAILY
Qty: 150 CAPSULE | Refills: 0 | Status: SHIPPED | OUTPATIENT
Start: 2023-12-12 | End: 2024-01-04

## 2023-12-12 RX ORDER — EPINEPHRINE 0.3 MG/.3ML
0.3 INJECTION SUBCUTANEOUS EVERY 5 MIN PRN
Status: CANCELLED | OUTPATIENT
Start: 2023-12-19

## 2023-12-12 RX ORDER — ONDANSETRON HYDROCHLORIDE 2 MG/ML
4 INJECTION, SOLUTION INTRAVENOUS ONCE
Status: CANCELLED | OUTPATIENT
Start: 2023-12-19 | End: 2023-12-19

## 2023-12-12 RX ORDER — DIPHENHYDRAMINE HYDROCHLORIDE 50 MG/ML
50 INJECTION INTRAMUSCULAR; INTRAVENOUS AS NEEDED
Status: CANCELLED | OUTPATIENT
Start: 2023-12-19

## 2023-12-12 RX ORDER — FLUTICASONE PROPIONATE 50 MCG
2 SPRAY, SUSPENSION (ML) NASAL
COMMUNITY
Start: 2019-04-11

## 2023-12-12 RX ORDER — FLUTICASONE FUROATE AND VILANTEROL 100; 25 UG/1; UG/1
POWDER RESPIRATORY (INHALATION)
COMMUNITY

## 2023-12-12 RX ORDER — NITROGLYCERIN 0.4 MG/1
0.4 TABLET SUBLINGUAL ONCE
Status: CANCELLED | OUTPATIENT
Start: 2023-12-19 | End: 2023-12-19

## 2023-12-12 RX ORDER — METOCLOPRAMIDE HYDROCHLORIDE 5 MG/ML
10 INJECTION INTRAMUSCULAR; INTRAVENOUS ONCE
Status: CANCELLED | OUTPATIENT
Start: 2023-12-19 | End: 2023-12-19

## 2023-12-12 RX ORDER — METOPROLOL TARTRATE 25 MG/1
25 TABLET, FILM COATED ORAL ONCE
Status: CANCELLED | OUTPATIENT
Start: 2023-12-19 | End: 2023-12-19

## 2023-12-12 RX ORDER — MONTELUKAST SODIUM 10 MG/1
1 TABLET ORAL NIGHTLY
COMMUNITY
Start: 2019-11-05

## 2023-12-12 RX ORDER — ALBUTEROL SULFATE 0.83 MG/ML
3 SOLUTION RESPIRATORY (INHALATION) AS NEEDED
Status: CANCELLED | OUTPATIENT
Start: 2023-12-19

## 2023-12-12 ASSESSMENT — ENCOUNTER SYMPTOMS
DIARRHEA: 0
ABDOMINAL PAIN: 0
CHEST TIGHTNESS: 0
DIFFICULTY URINATING: 0
FREQUENCY: 0
VOMITING: 0
BACK PAIN: 1
WEAKNESS: 0
FEVER: 0
DIZZINESS: 0
CONFUSION: 0
FATIGUE: 0
NERVOUS/ANXIOUS: 0
MYALGIAS: 1
NECK PAIN: 1
SHORTNESS OF BREATH: 0
CHILLS: 0
HEADACHES: 0
NAUSEA: 0
WHEEZING: 0
PALPITATIONS: 0

## 2023-12-12 ASSESSMENT — PAIN - FUNCTIONAL ASSESSMENT: PAIN_FUNCTIONAL_ASSESSMENT: 0-10

## 2023-12-12 ASSESSMENT — PAIN SCALES - GENERAL
PAINLEVEL: 6
PAINLEVEL_OUTOF10: 6

## 2023-12-12 ASSESSMENT — PAIN DESCRIPTION - DESCRIPTORS: DESCRIPTORS: SHARP;SHOOTING;RADIATING

## 2023-12-12 NOTE — PROGRESS NOTES
Chief Complain  Follow-up visit for IV infusion therapy every 2 weeks for widespread fibromyalgia pain which provides 80% relief for roughly 2 to 3 days and symptoms taper to 60% for 3 to 4 days.    History Of Present Illness  Iraida La is a 43 y.o. female here for chronic widespread body pain radiating to left upper and lower extremities . The patient rates the pain at 6 on a scale from 0-10.  The patient describes pain as sharp, radiating, shooting.  The pain is worsened by bending forward, standing, prolonged sitting, walking, and lifting and is alleviated by medications Tylenol, prescribed pain medications, and IV infusion therapy, position change, sitting, lying down, and   .  Since the last visit the pain has improved.    The patient denies any fever, chills, weight loss, weakness, bladder/ bowel incontinence, history of cancer, history of IV drug abuse, recent trauma.     Past Medical History  She has no past medical history on file.    Surgical History  She has no past surgical history on file.     Social History  She reports that she has quit smoking. Her smoking use included cigarettes. She has been exposed to tobacco smoke. She has never used smokeless tobacco. She reports current alcohol use of about 1.0 standard drink of alcohol per week. She reports that she does not currently use drugs.    Family History  Family History   Problem Relation Name Age of Onset    No Known Problems Mother      No Known Problems Father          Allergies  Aspirin, Erythromycin, Salicylates, Toradol [ketorolac], Erythromycin base, and Peanut    Review of Systems  Review of Systems   Constitutional:  Negative for chills, fatigue and fever.   Respiratory:  Negative for chest tightness, shortness of breath and wheezing.    Cardiovascular:  Negative for chest pain and palpitations.   Gastrointestinal:  Negative for abdominal pain, diarrhea, nausea and vomiting.   Genitourinary:  Negative for difficulty urinating and  "frequency.   Musculoskeletal:  Positive for back pain, myalgias and neck pain.        Wide spread body aches/joint pain   Neurological:  Negative for dizziness, weakness and headaches.   Psychiatric/Behavioral:  Negative for confusion and suicidal ideas. The patient is not nervous/anxious.         Physical Exam  Physical Exam  Constitutional:       Appearance: Normal appearance. She is normal weight.   HENT:      Head: Normocephalic.   Cardiovascular:      Rate and Rhythm: Normal rate.   Pulmonary:      Effort: Pulmonary effort is normal.   Musculoskeletal:      Lumbar back: Tenderness present. Decreased range of motion.   Neurological:      Mental Status: She is alert and oriented to person, place, and time.      Cranial Nerves: Cranial nerves 2-12 are intact.      Sensory: Sensation is intact.      Motor: Motor function is intact.      Coordination: Coordination is intact.      Gait: Gait is intact.      Deep Tendon Reflexes:      Reflex Scores:       Patellar reflexes are 3+ on the right side and 3+ on the left side.       Achilles reflexes are 3+ on the right side and 3+ on the left side.          Last Recorded Vitals  Blood pressure 110/77, pulse 89, temperature 35.6 °C (96.1 °F), temperature source Temporal, resp. rate 20, height 1.676 m (5' 6\"), weight 112 kg (248 lb), last menstrual period 06/12/2023, SpO2 100 %.    Reviewed Images      Reviewed Labs  Lab Results   Component Value Date    GLUCOSE 82 10/11/2022    CALCIUM 8.5 (L) 10/11/2022     10/11/2022    K 3.4 (L) 10/11/2022    CO2 24 10/11/2022     10/11/2022    BUN 10 10/11/2022    CREATININE 0.64 10/11/2022      Recall office visit 7/6/2023  This is 42 year year old female recall past medical history of [facility] injury in 2017 was with Dr. Estrella and with Sandeep used to be on hydrocodone that is done and case closed, fibromyalgia, and lower back pain and on disability. She is being treated with IV infusion therapy every 2 weeks which provides " 100% relief in symptoms for 3 to 4 days. Symptoms return if she increases her activity and ADLs. She is also using methocarbamol, lidocaine patches and Topamax which provide moderate relief in symptoms. She denies medication side effects. We will continue with current pain management regiment. Continue IV infusion therapy every 2 weeks consent and orders signed. Discussed prolonging to every 3 weeks in the future. She is scheduled to start physical therapy and acupuncture.     Recall office visit May 18, 2023  This is 42 year year old female recall past medical history of [facility] injury in 2017 was with Dr. Estrella and with Sandeep used to be on hydrocodone that is done and case closed, fibromyalgia, and pelvic pain and applying for disability who is here for follow-up and after multiple IV infusion therapy the patient had only 1 day of relief and then the pain came back. The patient really does not have much benefit from this comprehensive program. I gave her information about the high-dose ketamine at the [facility].     Recall office visit 2/14/2023:  The patient was referred to us by Referring Provider: Xiang Pinto, for pelvic pain, this is 42 year year old female with a history of fibromyalgia, chronic pain with menstrual cycle, DDD, presenting with cervical and lumbar pain. She denies trauma or injury related to presenting symptoms. she was previously treated with physical therapy, acupuncture and chiropractic manipulations which provided significant relief. In the past Dr. Hernández and Dr. Weiss at [facility] The past she was treated with multiple epidural steroid injections, lumbar RFA which provided moderate relief for roughly 2 weeks. She reports her last lumbar injection was February 2022. She continues to have cervical, lumbar, pelvic floor and generalized pain related to her fibromyalgia. She was trialed on Lyrica which did not provide relief in her symptoms. She iwas on gabapentin 300 mg  BID, which was recently increased to 3 times daily and has provided moderate relief in her symptoms. I enrolled her in the [facility] for anti-inflammatory diet. Provided referral for pain psychologist with Dr. Sharp. She was given a referral for aqua therapy, chiropractic manipulations and acupuncture. She was enrolled in opioid sparing infusion to help manage her fibromyalgia symptoms. I have personally reviewed the OARRS report. This report is scanned into the electronic medical record. I have considered the risks of abuse, dependence, addiction and diversion. She is to follow-up in 2 months after 4 IV infusion clinics completed or sooner if needed.  Surgical history: None  Procedures: Dr. Barajas and Dr. Ibanez at [facility] multiple (4) epidural steroid injections (cervical, thoracic and lumbar) . Last injection 2/2022 with minimal relief. also had RFA to lumbar region       Last procedure:   IV infusion therapy the patient has had a 80% improvement in pain and function for 4 days     Portions of record reviewed for pertinent issues: active problem list, medication list, allergies, family history, social history, notes from last encounter, encounters, lab results, imaging and other system records.        I have personally reviewed the OARRS report for this patient. This report is scanned into the electronic medical record. I have considered the risks of abuse, dependence, addiction and diversion. It showed gabapentin 300 mg twice daily from Jenna Israel MD   OPIOID RISK ASSESSMENT SCORE 1/26  Aberrant behavior: None     Pending law suits: Has applied for disability multiple times and now she has an   Social History: Lives at home with 4 children              Diagnostic studies:  10/11/2022 CT of the abdomen and pelvis showed desiccation of L1-2 and L3-4 disc with posterior calcification large at the L1-2 and minor at the L3-4        Assessment/Plan     Iraida La is a 43  y.o. female recall past medical history of [facility] injury in 2017 was with Dr. Estrella and with Sandeep used to be on hydrocodone that is done and case closed, fibromyalgia, and lower back pain and on disability. She is being treated with IV infusion therapy every 2 weeks which provides 100% relief in symptoms for 3 to 4 days then symptoms slowly returned to baseline.  Overall she has an improved quality of life and basic ADLs completed with less pain.  She previously completed physical therapy, chiropractic manipulations, and acupuncture.  She is looking forward to restarting these treatments after the new year.  SHe continues to incorporate home stretching as tolerated.  Provided orders for IV infusion therapy every 2 weeks.  Discussed prolonging to every 3 weeks in the future.  Provided trial of gabapentin 300 mg twice daily and 600 mg at bedtime for symptom relief.  Will continue with current plan of care and follow-up in 3 months or sooner if needed.  All questions and concerns answered.  I spent 30 minutes in the professional and overall care of this patient.     Jefferson Mckee, JO-CNP

## 2023-12-19 ENCOUNTER — INFUSION (OUTPATIENT)
Dept: INFUSION THERAPY | Facility: CLINIC | Age: 43
End: 2023-12-19
Payer: COMMERCIAL

## 2023-12-19 VITALS
HEART RATE: 80 BPM | OXYGEN SATURATION: 97 % | SYSTOLIC BLOOD PRESSURE: 127 MMHG | RESPIRATION RATE: 16 BRPM | TEMPERATURE: 97.5 F | DIASTOLIC BLOOD PRESSURE: 79 MMHG

## 2023-12-19 DIAGNOSIS — M79.7 FIBROMYALGIA: ICD-10-CM

## 2023-12-19 PROCEDURE — 2500000005 HC RX 250 GENERAL PHARMACY W/O HCPCS: Performed by: NURSE PRACTITIONER

## 2023-12-19 PROCEDURE — 2500000004 HC RX 250 GENERAL PHARMACY W/ HCPCS (ALT 636 FOR OP/ED): Performed by: NURSE PRACTITIONER

## 2023-12-19 PROCEDURE — 96365 THER/PROPH/DIAG IV INF INIT: CPT | Mod: INF

## 2023-12-19 PROCEDURE — 96368 THER/DIAG CONCURRENT INF: CPT | Mod: INF

## 2023-12-19 RX ORDER — NITROGLYCERIN 0.4 MG/1
0.4 TABLET SUBLINGUAL ONCE
Status: CANCELLED | OUTPATIENT
Start: 2024-01-09 | End: 2024-01-09

## 2023-12-19 RX ORDER — EPINEPHRINE 0.3 MG/.3ML
0.3 INJECTION SUBCUTANEOUS EVERY 5 MIN PRN
Status: CANCELLED | OUTPATIENT
Start: 2024-01-09

## 2023-12-19 RX ORDER — METOPROLOL TARTRATE 25 MG/1
25 TABLET, FILM COATED ORAL ONCE
Status: CANCELLED | OUTPATIENT
Start: 2024-01-09 | End: 2024-01-09

## 2023-12-19 RX ORDER — FAMOTIDINE 10 MG/ML
20 INJECTION INTRAVENOUS ONCE AS NEEDED
Status: CANCELLED | OUTPATIENT
Start: 2024-01-09

## 2023-12-19 RX ORDER — ONDANSETRON HYDROCHLORIDE 2 MG/ML
4 INJECTION, SOLUTION INTRAVENOUS ONCE
Status: CANCELLED | OUTPATIENT
Start: 2024-01-09 | End: 2024-01-09

## 2023-12-19 RX ORDER — METOCLOPRAMIDE HYDROCHLORIDE 5 MG/ML
10 INJECTION INTRAMUSCULAR; INTRAVENOUS ONCE
Status: CANCELLED | OUTPATIENT
Start: 2024-01-09 | End: 2024-01-09

## 2023-12-19 RX ORDER — DIPHENHYDRAMINE HYDROCHLORIDE 50 MG/ML
50 INJECTION INTRAMUSCULAR; INTRAVENOUS AS NEEDED
Status: CANCELLED | OUTPATIENT
Start: 2024-01-09

## 2023-12-19 RX ORDER — DIPHENHYDRAMINE HYDROCHLORIDE 50 MG/ML
25 INJECTION INTRAMUSCULAR; INTRAVENOUS ONCE
Status: CANCELLED | OUTPATIENT
Start: 2024-01-09 | End: 2024-01-09

## 2023-12-19 RX ORDER — ALBUTEROL SULFATE 0.83 MG/ML
3 SOLUTION RESPIRATORY (INHALATION) AS NEEDED
Status: CANCELLED | OUTPATIENT
Start: 2024-01-09

## 2023-12-19 RX ORDER — PROMETHAZINE HYDROCHLORIDE 25 MG/ML
12.5 INJECTION, SOLUTION INTRAMUSCULAR; INTRAVENOUS ONCE
Status: CANCELLED | OUTPATIENT
Start: 2024-01-09 | End: 2024-01-09

## 2023-12-19 RX ADMIN — PROPOFOL 100 MG: 10 INJECTION, EMULSION INTRAVENOUS at 10:28

## 2023-12-19 RX ADMIN — Medication: at 10:28

## 2023-12-19 ASSESSMENT — ENCOUNTER SYMPTOMS
OCCASIONAL FEELINGS OF UNSTEADINESS: 1
LOSS OF SENSATION IN FEET: 1
DEPRESSION: 0

## 2023-12-19 ASSESSMENT — PAIN SCALES - GENERAL
PAINLEVEL_OUTOF10: 9
PAINLEVEL_OUTOF10: 0 - NO PAIN

## 2023-12-19 NOTE — PROGRESS NOTES
S: Patient here for 11th opioid sparing pain infusion. Patient reports 80% reduction in pain after last infusion that lasted 1.5 days.    Purpose of pain infusion meds explained along with potential side effects.  Patient verbalized understanding.    B: Pain Issues in low back    A: Patient currently has pain described on flow sheet documentation. Designated  is pts neice.     R: Plan; Obtain IV access, do patient risk assessment, and start opioid sparing infusion as ordered. Monitoring for S/S of adverse reactions.    1115- Post infusion teaching provided via handout and verbal instruction. Patient verbalized understanding. VSS, Patient states pain is 0. Will assist patient to waiting car via wheelchair.

## 2024-01-03 DIAGNOSIS — G89.29 CHRONIC BILATERAL LOW BACK PAIN WITH BILATERAL SCIATICA: ICD-10-CM

## 2024-01-03 DIAGNOSIS — M79.7 FIBROMYALGIA: ICD-10-CM

## 2024-01-03 DIAGNOSIS — M54.42 CHRONIC BILATERAL LOW BACK PAIN WITH BILATERAL SCIATICA: ICD-10-CM

## 2024-01-03 DIAGNOSIS — M54.41 CHRONIC BILATERAL LOW BACK PAIN WITH BILATERAL SCIATICA: ICD-10-CM

## 2024-01-04 RX ORDER — GABAPENTIN 300 MG/1
300 CAPSULE ORAL 3 TIMES DAILY
Qty: 90 CAPSULE | Refills: 0 | Status: SHIPPED | OUTPATIENT
Start: 2024-01-10 | End: 2024-04-23 | Stop reason: SDUPTHER

## 2024-01-09 DIAGNOSIS — M79.7 FIBROMYALGIA: Primary | ICD-10-CM

## 2024-01-09 RX ORDER — NITROGLYCERIN 0.4 MG/1
0.4 TABLET SUBLINGUAL ONCE
Status: CANCELLED | OUTPATIENT
Start: 2024-01-09 | End: 2024-01-09

## 2024-01-09 RX ORDER — DIPHENHYDRAMINE HYDROCHLORIDE 50 MG/ML
25 INJECTION INTRAMUSCULAR; INTRAVENOUS ONCE
Status: CANCELLED | OUTPATIENT
Start: 2024-01-09 | End: 2024-01-09

## 2024-01-09 RX ORDER — METOCLOPRAMIDE HYDROCHLORIDE 5 MG/ML
10 INJECTION INTRAMUSCULAR; INTRAVENOUS ONCE
Status: CANCELLED | OUTPATIENT
Start: 2024-01-09 | End: 2024-01-09

## 2024-01-09 RX ORDER — METOPROLOL TARTRATE 25 MG/1
25 TABLET, FILM COATED ORAL ONCE
Status: CANCELLED | OUTPATIENT
Start: 2024-01-09 | End: 2024-01-09

## 2024-01-09 RX ORDER — EPINEPHRINE 0.3 MG/.3ML
0.3 INJECTION SUBCUTANEOUS EVERY 5 MIN PRN
Status: CANCELLED | OUTPATIENT
Start: 2024-01-09

## 2024-01-09 RX ORDER — ONDANSETRON HYDROCHLORIDE 2 MG/ML
4 INJECTION, SOLUTION INTRAVENOUS ONCE
Status: CANCELLED | OUTPATIENT
Start: 2024-01-09 | End: 2024-01-09

## 2024-01-09 RX ORDER — DIPHENHYDRAMINE HYDROCHLORIDE 50 MG/ML
50 INJECTION INTRAMUSCULAR; INTRAVENOUS AS NEEDED
Status: CANCELLED | OUTPATIENT
Start: 2024-01-09

## 2024-01-09 RX ORDER — FAMOTIDINE 10 MG/ML
20 INJECTION INTRAVENOUS ONCE AS NEEDED
Status: CANCELLED | OUTPATIENT
Start: 2024-01-09

## 2024-01-09 RX ORDER — ALBUTEROL SULFATE 0.83 MG/ML
3 SOLUTION RESPIRATORY (INHALATION) AS NEEDED
Status: CANCELLED | OUTPATIENT
Start: 2024-01-09

## 2024-01-09 RX ORDER — PROMETHAZINE HYDROCHLORIDE 25 MG/ML
12.5 INJECTION, SOLUTION INTRAMUSCULAR; INTRAVENOUS ONCE
Status: CANCELLED | OUTPATIENT
Start: 2024-01-09 | End: 2024-01-09

## 2024-01-09 RX ORDER — KETOROLAC TROMETHAMINE 30 MG/ML
30 INJECTION, SOLUTION INTRAMUSCULAR; INTRAVENOUS ONCE
Status: CANCELLED | OUTPATIENT
Start: 2024-01-09 | End: 2024-01-09

## 2024-01-11 ENCOUNTER — INFUSION (OUTPATIENT)
Dept: INFUSION THERAPY | Facility: CLINIC | Age: 44
End: 2024-01-11
Payer: COMMERCIAL

## 2024-01-11 VITALS
TEMPERATURE: 97.7 F | SYSTOLIC BLOOD PRESSURE: 121 MMHG | HEART RATE: 87 BPM | RESPIRATION RATE: 18 BRPM | OXYGEN SATURATION: 97 % | DIASTOLIC BLOOD PRESSURE: 76 MMHG

## 2024-01-11 DIAGNOSIS — M79.7 FIBROMYALGIA: ICD-10-CM

## 2024-01-11 PROCEDURE — 2500000005 HC RX 250 GENERAL PHARMACY W/O HCPCS: Performed by: NURSE PRACTITIONER

## 2024-01-11 PROCEDURE — 2500000004 HC RX 250 GENERAL PHARMACY W/ HCPCS (ALT 636 FOR OP/ED)

## 2024-01-11 PROCEDURE — 96365 THER/PROPH/DIAG IV INF INIT: CPT | Mod: INF

## 2024-01-11 PROCEDURE — 96368 THER/DIAG CONCURRENT INF: CPT | Mod: INF

## 2024-01-11 PROCEDURE — 2500000004 HC RX 250 GENERAL PHARMACY W/ HCPCS (ALT 636 FOR OP/ED): Performed by: NURSE PRACTITIONER

## 2024-01-11 RX ORDER — ONDANSETRON HYDROCHLORIDE 2 MG/ML
4 INJECTION, SOLUTION INTRAVENOUS ONCE
Status: COMPLETED | OUTPATIENT
Start: 2024-01-11 | End: 2024-01-11

## 2024-01-11 RX ORDER — EPINEPHRINE 0.3 MG/.3ML
0.3 INJECTION SUBCUTANEOUS EVERY 5 MIN PRN
Status: CANCELLED | OUTPATIENT
Start: 2024-01-30

## 2024-01-11 RX ORDER — DIPHENHYDRAMINE HYDROCHLORIDE 50 MG/ML
50 INJECTION INTRAMUSCULAR; INTRAVENOUS AS NEEDED
Status: CANCELLED | OUTPATIENT
Start: 2024-01-30

## 2024-01-11 RX ORDER — ONDANSETRON HYDROCHLORIDE 2 MG/ML
INJECTION, SOLUTION INTRAVENOUS
Status: COMPLETED
Start: 2024-01-11 | End: 2024-01-11

## 2024-01-11 RX ORDER — ALBUTEROL SULFATE 0.83 MG/ML
3 SOLUTION RESPIRATORY (INHALATION) AS NEEDED
Status: CANCELLED | OUTPATIENT
Start: 2024-01-30

## 2024-01-11 RX ORDER — DIPHENHYDRAMINE HYDROCHLORIDE 50 MG/ML
25 INJECTION INTRAMUSCULAR; INTRAVENOUS ONCE
Status: CANCELLED | OUTPATIENT
Start: 2024-01-30 | End: 2024-01-30

## 2024-01-11 RX ORDER — PROMETHAZINE HYDROCHLORIDE 25 MG/ML
12.5 INJECTION, SOLUTION INTRAMUSCULAR; INTRAVENOUS ONCE
Status: CANCELLED | OUTPATIENT
Start: 2024-01-30 | End: 2024-01-30

## 2024-01-11 RX ORDER — KETOROLAC TROMETHAMINE 30 MG/ML
30 INJECTION, SOLUTION INTRAMUSCULAR; INTRAVENOUS ONCE
Status: CANCELLED | OUTPATIENT
Start: 2024-01-30 | End: 2024-01-30

## 2024-01-11 RX ORDER — METOPROLOL TARTRATE 25 MG/1
25 TABLET, FILM COATED ORAL ONCE
Status: CANCELLED | OUTPATIENT
Start: 2024-01-30 | End: 2024-01-30

## 2024-01-11 RX ORDER — ONDANSETRON HYDROCHLORIDE 2 MG/ML
4 INJECTION, SOLUTION INTRAVENOUS ONCE
Status: CANCELLED | OUTPATIENT
Start: 2024-01-30 | End: 2024-01-30

## 2024-01-11 RX ORDER — FAMOTIDINE 10 MG/ML
20 INJECTION INTRAVENOUS ONCE AS NEEDED
Status: CANCELLED | OUTPATIENT
Start: 2024-01-30

## 2024-01-11 RX ORDER — NITROGLYCERIN 0.4 MG/1
0.4 TABLET SUBLINGUAL ONCE
Status: CANCELLED | OUTPATIENT
Start: 2024-01-30 | End: 2024-01-30

## 2024-01-11 RX ORDER — METOCLOPRAMIDE HYDROCHLORIDE 5 MG/ML
10 INJECTION INTRAMUSCULAR; INTRAVENOUS ONCE
Status: CANCELLED | OUTPATIENT
Start: 2024-01-30 | End: 2024-01-30

## 2024-01-11 RX ADMIN — Medication: at 15:49

## 2024-01-11 RX ADMIN — ONDANSETRON 4 MG: 2 INJECTION INTRAMUSCULAR; INTRAVENOUS at 16:12

## 2024-01-11 RX ADMIN — ONDANSETRON HYDROCHLORIDE 4 MG: 2 INJECTION, SOLUTION INTRAVENOUS at 16:12

## 2024-01-11 RX ADMIN — PROPOFOL 100 MG: 10 INJECTION, EMULSION INTRAVENOUS at 15:48

## 2024-01-11 ASSESSMENT — PAIN DESCRIPTION - DESCRIPTORS: DESCRIPTORS: ACHING;SHARP

## 2024-01-11 ASSESSMENT — ENCOUNTER SYMPTOMS
DEPRESSION: 1
OCCASIONAL FEELINGS OF UNSTEADINESS: 0
LOSS OF SENSATION IN FEET: 1

## 2024-01-11 ASSESSMENT — PAIN - FUNCTIONAL ASSESSMENT: PAIN_FUNCTIONAL_ASSESSMENT: 0-10

## 2024-01-11 ASSESSMENT — PAIN SCALES - GENERAL: PAINLEVEL_OUTOF10: 8

## 2024-01-11 NOTE — PROGRESS NOTES
S: Patient here for 12th opioid sparing pain infusion. Patient reports 80% reduction in pain after last infusion that lasted 2 days.    Purpose of pain infusion meds explained along with potential side effects.  Patient verbalized understanding.    B: Pain Issues    A: Patient currently has pain described on flow sheet documentation. Designated  is Sig other. Patient last ate solid food >8 hours ago, and had liquid >4 hours ago.    R: Plan; Obtain IV access, do patient risk assessment, and start opioid sparing infusion as ordered. Monitoring for S/S of adverse reactions.    1600: Patient is awake and appropriate, tolerating infusion well.    1612: Patient C/O feeling nauseated, Zofran given as ordered.    1618: Patient denies nausea as of this time.

## 2024-01-11 NOTE — PATIENT INSTRUCTIONS
Post infusion teaching provided via handout and verbal instruction. Patient verbalized understanding. VSS, Patient states pain is resolved. Will assist patient to waiting car via wheelchair.

## 2024-01-23 RX ORDER — KETOROLAC TROMETHAMINE 30 MG/ML
30 INJECTION, SOLUTION INTRAMUSCULAR; INTRAVENOUS ONCE
Status: CANCELLED | OUTPATIENT
Start: 2024-01-25 | End: 2024-01-25

## 2024-01-25 ENCOUNTER — INFUSION (OUTPATIENT)
Dept: INFUSION THERAPY | Facility: CLINIC | Age: 44
End: 2024-01-25
Payer: COMMERCIAL

## 2024-01-25 VITALS
RESPIRATION RATE: 16 BRPM | DIASTOLIC BLOOD PRESSURE: 74 MMHG | HEART RATE: 77 BPM | TEMPERATURE: 97.9 F | OXYGEN SATURATION: 98 % | SYSTOLIC BLOOD PRESSURE: 115 MMHG

## 2024-01-25 DIAGNOSIS — M79.7 FIBROMYALGIA: Primary | ICD-10-CM

## 2024-01-25 PROCEDURE — 2500000004 HC RX 250 GENERAL PHARMACY W/ HCPCS (ALT 636 FOR OP/ED): Performed by: NURSE PRACTITIONER

## 2024-01-25 PROCEDURE — 2500000004 HC RX 250 GENERAL PHARMACY W/ HCPCS (ALT 636 FOR OP/ED)

## 2024-01-25 PROCEDURE — 96365 THER/PROPH/DIAG IV INF INIT: CPT | Mod: INF

## 2024-01-25 PROCEDURE — 2500000005 HC RX 250 GENERAL PHARMACY W/O HCPCS: Performed by: NURSE PRACTITIONER

## 2024-01-25 PROCEDURE — 96368 THER/DIAG CONCURRENT INF: CPT | Mod: INF

## 2024-01-25 RX ORDER — EPINEPHRINE 0.3 MG/.3ML
0.3 INJECTION SUBCUTANEOUS EVERY 5 MIN PRN
Status: CANCELLED | OUTPATIENT
Start: 2024-02-15

## 2024-01-25 RX ORDER — FAMOTIDINE 10 MG/ML
20 INJECTION INTRAVENOUS ONCE AS NEEDED
Status: CANCELLED | OUTPATIENT
Start: 2024-02-15

## 2024-01-25 RX ORDER — ALBUTEROL SULFATE 0.83 MG/ML
3 SOLUTION RESPIRATORY (INHALATION) AS NEEDED
Status: CANCELLED | OUTPATIENT
Start: 2024-02-15

## 2024-01-25 RX ORDER — DIPHENHYDRAMINE HYDROCHLORIDE 50 MG/ML
25 INJECTION INTRAMUSCULAR; INTRAVENOUS ONCE
Status: CANCELLED | OUTPATIENT
Start: 2024-02-15 | End: 2024-02-15

## 2024-01-25 RX ORDER — KETOROLAC TROMETHAMINE 30 MG/ML
30 INJECTION, SOLUTION INTRAMUSCULAR; INTRAVENOUS ONCE
Status: CANCELLED | OUTPATIENT
Start: 2024-02-15 | End: 2024-02-15

## 2024-01-25 RX ORDER — ONDANSETRON HYDROCHLORIDE 2 MG/ML
INJECTION, SOLUTION INTRAVENOUS
Status: COMPLETED
Start: 2024-01-25 | End: 2024-01-25

## 2024-01-25 RX ORDER — METOPROLOL TARTRATE 25 MG/1
25 TABLET, FILM COATED ORAL ONCE
Status: CANCELLED | OUTPATIENT
Start: 2024-02-15 | End: 2024-02-15

## 2024-01-25 RX ORDER — METOCLOPRAMIDE HYDROCHLORIDE 5 MG/ML
10 INJECTION INTRAMUSCULAR; INTRAVENOUS ONCE
Status: CANCELLED | OUTPATIENT
Start: 2024-02-15 | End: 2024-02-15

## 2024-01-25 RX ORDER — ONDANSETRON HYDROCHLORIDE 2 MG/ML
4 INJECTION, SOLUTION INTRAVENOUS ONCE
Status: CANCELLED | OUTPATIENT
Start: 2024-02-15 | End: 2024-02-15

## 2024-01-25 RX ORDER — NITROGLYCERIN 0.4 MG/1
0.4 TABLET SUBLINGUAL ONCE
Status: CANCELLED | OUTPATIENT
Start: 2024-02-15 | End: 2024-02-15

## 2024-01-25 RX ORDER — DIPHENHYDRAMINE HYDROCHLORIDE 50 MG/ML
50 INJECTION INTRAMUSCULAR; INTRAVENOUS AS NEEDED
Status: CANCELLED | OUTPATIENT
Start: 2024-02-15

## 2024-01-25 RX ORDER — PROMETHAZINE HYDROCHLORIDE 25 MG/ML
12.5 INJECTION, SOLUTION INTRAMUSCULAR; INTRAVENOUS ONCE
Status: CANCELLED | OUTPATIENT
Start: 2024-02-15 | End: 2024-02-15

## 2024-01-25 RX ADMIN — Medication: at 15:33

## 2024-01-25 RX ADMIN — PROPOFOL 100 MG: 10 INJECTION, EMULSION INTRAVENOUS at 15:32

## 2024-01-25 RX ADMIN — ONDANSETRON 4 MG: 2 INJECTION INTRAMUSCULAR; INTRAVENOUS at 16:00

## 2024-01-25 ASSESSMENT — PAIN - FUNCTIONAL ASSESSMENT: PAIN_FUNCTIONAL_ASSESSMENT: 0-10

## 2024-01-25 ASSESSMENT — ENCOUNTER SYMPTOMS
LOSS OF SENSATION IN FEET: 1
OCCASIONAL FEELINGS OF UNSTEADINESS: 1
DEPRESSION: 1

## 2024-01-25 ASSESSMENT — PAIN SCALES - GENERAL: PAINLEVEL_OUTOF10: 10 - WORST POSSIBLE PAIN

## 2024-01-25 ASSESSMENT — PAIN DESCRIPTION - DESCRIPTORS: DESCRIPTORS: ACHING;SHARP

## 2024-01-25 NOTE — PROGRESS NOTES
Post infusion teaching provided via handout and verbal instruction. Patient verbalized understanding. VSS, Patient states pain is zero and tolerated infusions well without difficulty

## 2024-01-25 NOTE — PROGRESS NOTES
S: Patient here for 13 th opioid sparing pain infusion. Patient reports 75% reduction in pain after last infusion that lasted 1 day.    Purpose of pain infusion meds explained along with potential side effects.  Patient verbalized understanding.    B: Pain Issues    A: Patient currently has pain described on flow sheet documentation. Designated  is Sig other. Patient last ate solid food >8 hours ago, and had liquid >4 hours ago.    R: Plan; Obtain IV access, do patient risk assessment, and start opioid sparing infusion as ordered. Monitoring for S/S of adverse reactions.

## 2024-01-29 ENCOUNTER — TELEPHONE (OUTPATIENT)
Dept: OBSTETRICS AND GYNECOLOGY | Facility: CLINIC | Age: 44
End: 2024-01-29
Payer: COMMERCIAL

## 2024-01-29 ENCOUNTER — APPOINTMENT (OUTPATIENT)
Dept: PAIN MEDICINE | Facility: CLINIC | Age: 44
End: 2024-01-29
Payer: COMMERCIAL

## 2024-01-29 NOTE — TELEPHONE ENCOUNTER
"RN called Patient to schedule her daughter's GYN Annual Appt  Patient states she, herself, needs to see Dr Pinto   Patient was seen in the emergency room and is currently inpatient at a [facility] location due to a \"4.4cm cust on my ovary\"    Patient could not tell me which ovary the cyst is one  Patient will bring paperwork from the hospitalization to her appointment  RN made appointment for 2/19/2024  Marnie Patel RN  "

## 2024-01-31 ENCOUNTER — TELEMEDICINE (OUTPATIENT)
Dept: PAIN MEDICINE | Facility: CLINIC | Age: 44
End: 2024-01-31
Payer: COMMERCIAL

## 2024-01-31 DIAGNOSIS — R29.898 WEAKNESS OF LEFT LOWER EXTREMITY: ICD-10-CM

## 2024-01-31 DIAGNOSIS — M51.36 DDD (DEGENERATIVE DISC DISEASE), LUMBAR: ICD-10-CM

## 2024-01-31 DIAGNOSIS — M79.7 FIBROMYALGIA: Primary | ICD-10-CM

## 2024-01-31 DIAGNOSIS — R29.898 WEAKNESS OF LEFT UPPER EXTREMITY: ICD-10-CM

## 2024-01-31 PROCEDURE — 99214 OFFICE O/P EST MOD 30 MIN: CPT | Performed by: NURSE PRACTITIONER

## 2024-01-31 ASSESSMENT — ENCOUNTER SYMPTOMS
NUMBNESS: 1
CHEST TIGHTNESS: 0
WEAKNESS: 1
AGITATION: 0
CONFUSION: 0
FATIGUE: 0
ARTHRALGIAS: 1
DIARRHEA: 0
NAUSEA: 0
SHORTNESS OF BREATH: 0
WHEEZING: 0
BACK PAIN: 1
PALPITATIONS: 0
CHILLS: 0
FREQUENCY: 0
DIFFICULTY URINATING: 0
MYALGIAS: 1

## 2024-01-31 ASSESSMENT — PAIN SCALES - GENERAL: PAINLEVEL_OUTOF10: 9

## 2024-01-31 ASSESSMENT — PAIN - FUNCTIONAL ASSESSMENT: PAIN_FUNCTIONAL_ASSESSMENT: 0-10

## 2024-01-31 NOTE — PROGRESS NOTES
Chief Complain  Follow-up for IV infusion therapy every 3 weeks for lower back fibromyalgia pain which provides 80% relief for roughly 3 to 4 days depending on activity level and then symptoms slowly taper to 50 to 60% for 3 to 4 days.    History Of Present Illness  Iraida La is a 43 y.o. female here for chronic lower back pain radiating to bilateral lower extremities. The patient rates the pain at 10 on a scale from 0-10.  The patient describes pain as dull, aching, radiating, throbbing.  The pain is worsened by standing, prolonged sitting, walking, lifting, and lying down and is alleviated by position change, lying down, and IV infusion therapy .  Since the last visit the pain has stayed the same.  Of note patient is in [facility] with multiple bouts of left upper and lower extremity numbness tingling and weakness, per patient's symptoms associated with long-haul COVID.  The patient denies any fever, chills, weight loss, weakness, bladder/ bowel incontinence, history of cancer, history of IV drug abuse, recent trauma.     Past Medical History  She has no past medical history on file.    Surgical History  She has no past surgical history on file.     Social History  She reports that she has quit smoking. Her smoking use included cigarettes. She has been exposed to tobacco smoke. She has never used smokeless tobacco. She reports current alcohol use of about 1.0 standard drink of alcohol per week. She reports that she does not currently use drugs.    Family History  Family History   Problem Relation Name Age of Onset    No Known Problems Mother      No Known Problems Father          Allergies  Aspirin, Erythromycin, Salicylates, Toradol [ketorolac], Erythromycin base, and Peanut    Review of Systems  Review of Systems   Constitutional:  Negative for chills and fatigue.   Respiratory:  Negative for chest tightness, shortness of breath and wheezing.    Cardiovascular:  Negative for chest pain and  palpitations.   Gastrointestinal:  Negative for diarrhea and nausea.   Genitourinary:  Negative for difficulty urinating, frequency and urgency.   Musculoskeletal:  Positive for arthralgias, back pain and myalgias.        Lower back pain radiating bilateral lower extremities, reports left upper and lower extremity weakness currently in [facility].   Neurological:  Positive for weakness and numbness.        Left upper and lower extremity weakness with numbness and tingling.   Psychiatric/Behavioral:  Negative for agitation, confusion and suicidal ideas.         Physical Exam  Physical Exam  Constitutional:       Appearance: She is obese.   HENT:      Head: Normocephalic.      Mouth/Throat:      Mouth: Mucous membranes are moist.   Eyes:      Extraocular Movements: Extraocular movements intact.   Cardiovascular:      Rate and Rhythm: Normal rate.   Pulmonary:      Effort: Pulmonary effort is normal.   Neurological:      General: No focal deficit present.      Mental Status: She is alert and oriented to person, place, and time.   Psychiatric:         Mood and Affect: Mood normal.         Behavior: Behavior normal.        Last Recorded Vitals  Virtual visit.    Reviewed Images  1/30/2024   MRI and MRA of brain/cervical spine  IMPRESSION:     1. Normal MRI brain.     2. Normal MRI of the cervical spine.     3.  Patent MRA [place] [facility] [place].     COUNTING REFERENCE: Superior cervical disc is taken as C2-3.  Structural   anomalies: None.     Reviewed Labs  Lab Results   Component Value Date    GLUCOSE 82 10/11/2022    CALCIUM 8.5 (L) 10/11/2022     10/11/2022    K 3.4 (L) 10/11/2022    CO2 24 10/11/2022     10/11/2022    BUN 10 10/11/2022    CREATININE 0.64 10/11/2022         Assessment/Plan     Iraida La is a 43 y.o. female recall past medical history of [facility] injury in 2017 was with Dr. Estrella and with Sandeep used to be on hydrocodone that is done and case closed, he has fibromyalgia, and lumbar  pain and on disability. She is being treated with IV infusion therapy every 3 weeks which provides 80% relief in symptoms for 3 to 4 days, then symptoms slowly return if she increases her activity and ADLs. She is also using methocarbamol, lidocaine patches and Topamax which provide moderate relief in symptoms.  She denies medication side effects.  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  Patient reports plans for physical therapy once discharged.  Advised patient about [facility] long-haul COVID program.  Discussed treatment and IV infusion and given the results the patient receives a plan to continue with IV infusion every 3 weeks for fibromyalgia pain.  The infusion therapy allows her to complete her ADLs and improve her quality of life.  All questions and concerns answered.  Plan to follow-up in 3 months or sooner if needed.    I spent 30 minutes in the professional and overall care of this patient.       Jefferson Mckee, APRN-CNP

## 2024-02-19 ENCOUNTER — OFFICE VISIT (OUTPATIENT)
Dept: OBSTETRICS AND GYNECOLOGY | Facility: CLINIC | Age: 44
End: 2024-02-19
Payer: COMMERCIAL

## 2024-02-19 VITALS
DIASTOLIC BLOOD PRESSURE: 76 MMHG | WEIGHT: 236.6 LBS | SYSTOLIC BLOOD PRESSURE: 114 MMHG | BODY MASS INDEX: 38.02 KG/M2 | HEIGHT: 66 IN

## 2024-02-19 DIAGNOSIS — N83.201 RIGHT OVARIAN CYST: ICD-10-CM

## 2024-02-19 DIAGNOSIS — Z09 FOLLOW-UP EXAMINATION: Primary | ICD-10-CM

## 2024-02-19 PROCEDURE — 99213 OFFICE O/P EST LOW 20 MIN: CPT | Performed by: OBSTETRICS & GYNECOLOGY

## 2024-02-19 PROCEDURE — 1036F TOBACCO NON-USER: CPT | Performed by: OBSTETRICS & GYNECOLOGY

## 2024-02-19 ASSESSMENT — PATIENT HEALTH QUESTIONNAIRE - PHQ9
SUM OF ALL RESPONSES TO PHQ9 QUESTIONS 1 AND 2: 2
2. FEELING DOWN, DEPRESSED OR HOPELESS: SEVERAL DAYS
1. LITTLE INTEREST OR PLEASURE IN DOING THINGS: SEVERAL DAYS
10. IF YOU CHECKED OFF ANY PROBLEMS, HOW DIFFICULT HAVE THESE PROBLEMS MADE IT FOR YOU TO DO YOUR WORK, TAKE CARE OF THINGS AT HOME, OR GET ALONG WITH OTHER PEOPLE: SOMEWHAT DIFFICULT

## 2024-02-19 ASSESSMENT — ENCOUNTER SYMPTOMS
CONSTITUTIONAL NEGATIVE: 0
MYALGIAS: 1
BACK PAIN: 1
ALLERGIC/IMMUNOLOGIC NEGATIVE: 0
DIZZINESS: 1
FATIGUE: 1
WHEEZING: 1
CHILLS: 1
GASTROINTESTINAL NEGATIVE: 0
RESPIRATORY NEGATIVE: 0
VOMITING: 1
JOINT SWELLING: 1
ABDOMINAL PAIN: 1
PSYCHIATRIC NEGATIVE: 0
ENDOCRINE NEGATIVE: 0
MUSCULOSKELETAL NEGATIVE: 0
LIGHT-HEADEDNESS: 1
UNEXPECTED WEIGHT CHANGE: 1
CARDIOVASCULAR NEGATIVE: 0
NEUROLOGICAL NEGATIVE: 0
SINUS PRESSURE: 1
HEMATOLOGIC/LYMPHATIC NEGATIVE: 0
NUMBNESS: 1
EYES NEGATIVE: 0
NAUSEA: 1

## 2024-02-19 ASSESSMENT — PAIN SCALES - GENERAL: PAINLEVEL: 5

## 2024-02-19 NOTE — PROGRESS NOTES
Subjective   Patient ID: Iraida La is a 43 y.o. female who presents for ER Follow-up (Pt here for ER FU Rt Cyst 4.4 cm. Last pap 6/20/23 wnl. HPV Negative.).  Patient was seen in the [facility] emergency room on February 3 with follow-up in internal medicine on February 8.  [facility] chart reviewed notes reviewed imaging also reviewed.  Patient was initially seen complaining of left-sided weakness left arm pain.  Was diagnosis long-term COVID.  DVT was ruled out.  CT scan was also negative.  During this workup an incidental finding of a 4.2 cm cyst was noted    ER Follow-up  Associated symptoms include abdominal pain, chills, fatigue, joint swelling, myalgias, nausea, numbness and vomiting.   Follow-up for 4.2 cm simple cyst    Review of Systems   Constitutional:  Positive for chills, fatigue and unexpected weight change.   HENT:  Positive for nosebleeds and sinus pressure.    Eyes:  Positive for visual disturbance.   Respiratory:  Positive for wheezing.    Gastrointestinal:  Positive for abdominal pain, nausea and vomiting.   Genitourinary:  Positive for menstrual problem.   Musculoskeletal:  Positive for back pain, joint swelling and myalgias.   Neurological:  Positive for dizziness, light-headedness and numbness.       Objective   Physical Exam  Vitals reviewed.   Constitutional:       Appearance: Normal appearance. She is obese.   Cardiovascular:      Rate and Rhythm: Normal rate and regular rhythm.   Pulmonary:      Effort: Pulmonary effort is normal.      Breath sounds: Normal breath sounds.   Abdominal:      General: There is no distension.      Palpations: Abdomen is soft. There is no mass.      Tenderness: There is no abdominal tenderness. There is no guarding.   Genitourinary:     Comments: Genitalia unremarkable  Vagina clear  Cervix uterus adnexa not well palpated due to size  Perineum without lesions or inflammation  Musculoskeletal:         General: Normal range of motion.    Neurological:      Mental Status: She is alert.   Psychiatric:         Mood and Affect: Mood normal.     Assessment/Plan   Diagnoses and all orders for this visit:  Follow-up examination  Right ovarian cyst  Patient was sent in from emergency room [facility] for further evaluation.  Review of chart was done along with previous CT scan and ultrasound.  4.2 simple right ovarian cyst noted.  No other obvious abnormalities in the pelvis.  Patient notes she currently has no pelvic complaints.  Has occasional abdominal pain which is a longstanding issue.  No further follow-up recommended.  Patient has multiple complaints on the review of system.  Current medical opinion is that this is all tied to long-term COVID.  Patient is scheduled for physical therapy for left body weakness.  Will continue following up with primary care physician       Xiang Pinto MD 02/19/24 11:22 AM

## 2024-02-22 RX ORDER — KETOROLAC TROMETHAMINE 30 MG/ML
30 INJECTION, SOLUTION INTRAMUSCULAR; INTRAVENOUS ONCE
Status: CANCELLED | OUTPATIENT
Start: 2024-02-23 | End: 2024-02-23

## 2024-02-23 ENCOUNTER — INFUSION (OUTPATIENT)
Dept: INFUSION THERAPY | Facility: CLINIC | Age: 44
End: 2024-02-23
Payer: COMMERCIAL

## 2024-02-23 VITALS
DIASTOLIC BLOOD PRESSURE: 77 MMHG | SYSTOLIC BLOOD PRESSURE: 121 MMHG | OXYGEN SATURATION: 96 % | HEART RATE: 88 BPM | TEMPERATURE: 97.7 F | RESPIRATION RATE: 17 BRPM

## 2024-02-23 DIAGNOSIS — M79.7 FIBROMYALGIA: Primary | ICD-10-CM

## 2024-02-23 PROCEDURE — 2500000005 HC RX 250 GENERAL PHARMACY W/O HCPCS: Performed by: NURSE PRACTITIONER

## 2024-02-23 PROCEDURE — 96365 THER/PROPH/DIAG IV INF INIT: CPT | Mod: INF

## 2024-02-23 PROCEDURE — 2500000004 HC RX 250 GENERAL PHARMACY W/ HCPCS (ALT 636 FOR OP/ED): Performed by: NURSE PRACTITIONER

## 2024-02-23 PROCEDURE — 96368 THER/DIAG CONCURRENT INF: CPT | Mod: INF

## 2024-02-23 RX ORDER — METOPROLOL TARTRATE 25 MG/1
25 TABLET, FILM COATED ORAL ONCE
Status: CANCELLED | OUTPATIENT
Start: 2024-03-15 | End: 2024-03-15

## 2024-02-23 RX ORDER — DIPHENHYDRAMINE HYDROCHLORIDE 50 MG/ML
25 INJECTION INTRAMUSCULAR; INTRAVENOUS ONCE
Status: CANCELLED | OUTPATIENT
Start: 2024-03-15 | End: 2024-03-15

## 2024-02-23 RX ORDER — MIDODRINE HYDROCHLORIDE 2.5 MG/1
2.5 TABLET ORAL 2 TIMES DAILY
COMMUNITY

## 2024-02-23 RX ORDER — PROMETHAZINE HYDROCHLORIDE 25 MG/ML
12.5 INJECTION, SOLUTION INTRAMUSCULAR; INTRAVENOUS ONCE
Status: CANCELLED | OUTPATIENT
Start: 2024-03-15 | End: 2024-03-15

## 2024-02-23 RX ORDER — ALBUTEROL SULFATE 0.83 MG/ML
3 SOLUTION RESPIRATORY (INHALATION) AS NEEDED
Status: CANCELLED | OUTPATIENT
Start: 2024-03-15

## 2024-02-23 RX ORDER — DIPHENHYDRAMINE HYDROCHLORIDE 50 MG/ML
50 INJECTION INTRAMUSCULAR; INTRAVENOUS AS NEEDED
Status: CANCELLED | OUTPATIENT
Start: 2024-03-15

## 2024-02-23 RX ORDER — FAMOTIDINE 10 MG/ML
20 INJECTION INTRAVENOUS ONCE AS NEEDED
Status: CANCELLED | OUTPATIENT
Start: 2024-03-15

## 2024-02-23 RX ORDER — NITROGLYCERIN 0.4 MG/1
0.4 TABLET SUBLINGUAL ONCE
Status: CANCELLED | OUTPATIENT
Start: 2024-03-15 | End: 2024-03-15

## 2024-02-23 RX ORDER — ONDANSETRON HYDROCHLORIDE 2 MG/ML
4 INJECTION, SOLUTION INTRAVENOUS ONCE
Status: CANCELLED | OUTPATIENT
Start: 2024-03-15 | End: 2024-03-15

## 2024-02-23 RX ORDER — EPINEPHRINE 0.3 MG/.3ML
0.3 INJECTION SUBCUTANEOUS EVERY 5 MIN PRN
Status: CANCELLED | OUTPATIENT
Start: 2024-03-15

## 2024-02-23 RX ORDER — KETOROLAC TROMETHAMINE 30 MG/ML
30 INJECTION, SOLUTION INTRAMUSCULAR; INTRAVENOUS ONCE
Status: CANCELLED | OUTPATIENT
Start: 2024-03-15 | End: 2024-03-15

## 2024-02-23 RX ORDER — METOCLOPRAMIDE HYDROCHLORIDE 5 MG/ML
10 INJECTION INTRAMUSCULAR; INTRAVENOUS ONCE
Status: CANCELLED | OUTPATIENT
Start: 2024-03-15 | End: 2024-03-15

## 2024-02-23 RX ADMIN — Medication: at 09:43

## 2024-02-23 RX ADMIN — PROPOFOL 100 MG: 10 INJECTION, EMULSION INTRAVENOUS at 09:43

## 2024-02-23 ASSESSMENT — LIFESTYLE VARIABLES
HOW MANY STANDARD DRINKS CONTAINING ALCOHOL DO YOU HAVE ON A TYPICAL DAY: PATIENT DOES NOT DRINK
HOW OFTEN DO YOU HAVE A DRINK CONTAINING ALCOHOL: MONTHLY OR LESS
SKIP TO QUESTIONS 9-10: 1
AUDIT-C TOTAL SCORE: 1
HOW OFTEN DO YOU HAVE SIX OR MORE DRINKS ON ONE OCCASION: NEVER

## 2024-02-23 ASSESSMENT — PAIN SCALES - GENERAL
PAINLEVEL_OUTOF10: 0 - NO PAIN
PAINLEVEL: 9
PAINLEVEL_OUTOF10: 9

## 2024-02-23 ASSESSMENT — PATIENT HEALTH QUESTIONNAIRE - PHQ9
SUM OF ALL RESPONSES TO PHQ9 QUESTIONS 1 AND 2: 2
2. FEELING DOWN, DEPRESSED OR HOPELESS: SEVERAL DAYS
1. LITTLE INTEREST OR PLEASURE IN DOING THINGS: SEVERAL DAYS

## 2024-02-23 ASSESSMENT — ENCOUNTER SYMPTOMS
DEPRESSION: 1
LOSS OF SENSATION IN FEET: 1
OCCASIONAL FEELINGS OF UNSTEADINESS: 0

## 2024-02-23 ASSESSMENT — COLUMBIA-SUICIDE SEVERITY RATING SCALE - C-SSRS
2. HAVE YOU ACTUALLY HAD ANY THOUGHTS OF KILLING YOURSELF?: NO
1. IN THE PAST MONTH, HAVE YOU WISHED YOU WERE DEAD OR WISHED YOU COULD GO TO SLEEP AND NOT WAKE UP?: NO
6. HAVE YOU EVER DONE ANYTHING, STARTED TO DO ANYTHING, OR PREPARED TO DO ANYTHING TO END YOUR LIFE?: NO

## 2024-02-23 ASSESSMENT — PAIN DESCRIPTION - DESCRIPTORS: DESCRIPTORS: ACHING

## 2024-02-23 ASSESSMENT — PAIN - FUNCTIONAL ASSESSMENT
PAIN_FUNCTIONAL_ASSESSMENT: 0-10
PAIN_FUNCTIONAL_ASSESSMENT: 0-10

## 2024-02-23 NOTE — PATIENT INSTRUCTIONS
Today :We administered (ketamine 30 mg, lidocaine (Xylocaine) 20 mg/mL (2 %) 300 mg in sodium chloride 0.9% 500 mL IV) and propofol (Diprivan) 100 mg in dextrose 5 % in water (D5W) 25 mL.     For:   1. Fibromyalgia           [facility]      Pain Infusion Aftercare Instructions      1. It is normal to feel sedated, tired and low in energy after a pain infusion. DO NOT DRIVE, OPERATE ANY MACHINERY, OR MAKE ANY IMPORTANT DECISIONS FOR AT LEAST 24 HOURS AFTER THE INFUSION.     2. Call the pain center at [phone] with any problems, questions, or concerns.     3. Eat light after the infusion. If you feel queasy or sick to your stomach, laying down with your eyes closed may help. When you resume eating start with something mild like clear liquids, yogurt, applesauce, crackers, etc… Gradually advance to a regular diet.     4. Do not leave your house alone the evening of your pain infusion.     5. No alcohol or sedative medications, such as sleeping pills, for 24 hours after your pain infusion.     6. Resume all other prescribed medications unless directed otherwise by you physician.     7. If you have any medical emergencies, call 911 or go directly to the closest emergency room.     Please read the  Medication Guide that was given to you and reviewed during todays visit.     (Tell all doctors including dentists that you are taking this medication)     Go to the emergency room or call 911 if:  -You have signs of allergic reaction:   -Rash, hives, itching.   -Swollen, blistered, peeling skin.   -Swelling of face, lips, mouth, tongue or throat.   -Tightness of chest, trouble breathing, swallowing or talking     Call your doctor:  - If IV / injection site gets red, warm, swollen, itchy or leaks fluid or pus.     (Leave dressing on your IV site for at least 2 hours and keep area clean and dry  - If you get sick or have symptoms of infection or are not feeling well for any reason.    (Wash your  hands often, stay away from people who are sick)  - If you have side effects from your medication that do not go away or are bothersome.     (Refer to the teaching your nurse gave you for side effects to call your doctor about)    - Common side effects may include:  stuffy nose, headache, feeling tired, muscle aches, upset stomach  - Before receiving any vaccines     - Call [facility] at   If:  - You get sick, are on antibiotics, have had a recent vaccine, have surgery or dental work and your doctor wants your visit rescheduled.  - You need to cancel and reschedule your visit for any reason. Call at least 2 days before your visit if you need to cancel.   - Your insurance changes before your next visit.    (We will need to get approval from your new insurance. This can take up to two weeks.)     The [facility] is opened Monday thru Friday. We are closed on weekends and holidays.   Voice mail will take your call if the center is closed. If you leave a message please allow 24 hours for a call back during weekdays. If you leave a message on a weekend/holiday, we will call you back the next business day.

## 2024-03-05 RX ORDER — KETOROLAC TROMETHAMINE 30 MG/ML
30 INJECTION, SOLUTION INTRAMUSCULAR; INTRAVENOUS ONCE
Status: CANCELLED | OUTPATIENT
Start: 2024-03-08 | End: 2024-03-08

## 2024-03-08 ENCOUNTER — APPOINTMENT (OUTPATIENT)
Dept: INFUSION THERAPY | Facility: CLINIC | Age: 44
End: 2024-03-08
Payer: COMMERCIAL

## 2024-03-08 DIAGNOSIS — M79.7 FIBROMYALGIA: Primary | ICD-10-CM

## 2024-03-12 ENCOUNTER — APPOINTMENT (OUTPATIENT)
Dept: PAIN MEDICINE | Facility: CLINIC | Age: 44
End: 2024-03-12
Payer: COMMERCIAL

## 2024-03-26 DIAGNOSIS — M79.7 FIBROMYALGIA: Primary | ICD-10-CM

## 2024-03-29 ENCOUNTER — INFUSION (OUTPATIENT)
Dept: INFUSION THERAPY | Facility: CLINIC | Age: 44
End: 2024-03-29
Payer: COMMERCIAL

## 2024-03-29 VITALS
SYSTOLIC BLOOD PRESSURE: 121 MMHG | DIASTOLIC BLOOD PRESSURE: 75 MMHG | OXYGEN SATURATION: 95 % | HEART RATE: 81 BPM | RESPIRATION RATE: 13 BRPM | TEMPERATURE: 95.7 F

## 2024-03-29 DIAGNOSIS — M79.7 FIBROMYALGIA: ICD-10-CM

## 2024-03-29 PROCEDURE — 96365 THER/PROPH/DIAG IV INF INIT: CPT | Mod: INF

## 2024-03-29 PROCEDURE — 2500000005 HC RX 250 GENERAL PHARMACY W/O HCPCS: Performed by: NURSE PRACTITIONER

## 2024-03-29 PROCEDURE — 96368 THER/DIAG CONCURRENT INF: CPT | Mod: INF

## 2024-03-29 PROCEDURE — 2500000004 HC RX 250 GENERAL PHARMACY W/ HCPCS (ALT 636 FOR OP/ED)

## 2024-03-29 PROCEDURE — 2500000004 HC RX 250 GENERAL PHARMACY W/ HCPCS (ALT 636 FOR OP/ED): Performed by: NURSE PRACTITIONER

## 2024-03-29 RX ORDER — NITROGLYCERIN 0.4 MG/1
0.4 TABLET SUBLINGUAL ONCE
Status: CANCELLED | OUTPATIENT
Start: 2024-04-19 | End: 2024-04-19

## 2024-03-29 RX ORDER — DIPHENHYDRAMINE HYDROCHLORIDE 50 MG/ML
25 INJECTION INTRAMUSCULAR; INTRAVENOUS ONCE
Status: CANCELLED | OUTPATIENT
Start: 2024-04-19 | End: 2024-04-19

## 2024-03-29 RX ORDER — METOPROLOL TARTRATE 25 MG/1
25 TABLET, FILM COATED ORAL ONCE
Status: CANCELLED | OUTPATIENT
Start: 2024-04-19 | End: 2024-04-19

## 2024-03-29 RX ORDER — ONDANSETRON HYDROCHLORIDE 2 MG/ML
4 INJECTION, SOLUTION INTRAVENOUS ONCE
Status: CANCELLED | OUTPATIENT
Start: 2024-04-19 | End: 2024-04-19

## 2024-03-29 RX ORDER — DIPHENHYDRAMINE HYDROCHLORIDE 50 MG/ML
50 INJECTION INTRAMUSCULAR; INTRAVENOUS AS NEEDED
Status: CANCELLED | OUTPATIENT
Start: 2024-04-19

## 2024-03-29 RX ORDER — METOCLOPRAMIDE HYDROCHLORIDE 5 MG/ML
10 INJECTION INTRAMUSCULAR; INTRAVENOUS ONCE
Status: CANCELLED | OUTPATIENT
Start: 2024-04-19 | End: 2024-04-19

## 2024-03-29 RX ORDER — ALBUTEROL SULFATE 0.83 MG/ML
3 SOLUTION RESPIRATORY (INHALATION) AS NEEDED
Status: CANCELLED | OUTPATIENT
Start: 2024-04-19

## 2024-03-29 RX ORDER — ONDANSETRON HYDROCHLORIDE 2 MG/ML
4 INJECTION, SOLUTION INTRAVENOUS ONCE
Status: COMPLETED | OUTPATIENT
Start: 2024-03-29 | End: 2024-03-29

## 2024-03-29 RX ORDER — FAMOTIDINE 10 MG/ML
20 INJECTION INTRAVENOUS ONCE AS NEEDED
Status: CANCELLED | OUTPATIENT
Start: 2024-04-19

## 2024-03-29 RX ORDER — ONDANSETRON HYDROCHLORIDE 2 MG/ML
INJECTION, SOLUTION INTRAVENOUS
Status: COMPLETED
Start: 2024-03-29 | End: 2024-03-29

## 2024-03-29 RX ORDER — EPINEPHRINE 0.3 MG/.3ML
0.3 INJECTION SUBCUTANEOUS EVERY 5 MIN PRN
Status: CANCELLED | OUTPATIENT
Start: 2024-04-19

## 2024-03-29 RX ADMIN — Medication: at 14:54

## 2024-03-29 RX ADMIN — PROPOFOL 100 MG: 10 INJECTION, EMULSION INTRAVENOUS at 14:54

## 2024-03-29 RX ADMIN — ONDANSETRON 4 MG: 2 INJECTION INTRAMUSCULAR; INTRAVENOUS at 15:27

## 2024-03-29 RX ADMIN — ONDANSETRON HYDROCHLORIDE 4 MG: 2 INJECTION, SOLUTION INTRAVENOUS at 15:27

## 2024-03-29 ASSESSMENT — PAIN - FUNCTIONAL ASSESSMENT
PAIN_FUNCTIONAL_ASSESSMENT: 0-10
PAIN_FUNCTIONAL_ASSESSMENT: 0-10

## 2024-03-29 ASSESSMENT — PAIN SCALES - GENERAL
PAINLEVEL_OUTOF10: 9
PAINLEVEL: 9
PAINLEVEL_OUTOF10: 0 - NO PAIN

## 2024-03-29 ASSESSMENT — ENCOUNTER SYMPTOMS
OCCASIONAL FEELINGS OF UNSTEADINESS: 0
LOSS OF SENSATION IN FEET: 1
DEPRESSION: 1

## 2024-03-29 ASSESSMENT — COLUMBIA-SUICIDE SEVERITY RATING SCALE - C-SSRS
6. HAVE YOU EVER DONE ANYTHING, STARTED TO DO ANYTHING, OR PREPARED TO DO ANYTHING TO END YOUR LIFE?: NO
2. HAVE YOU ACTUALLY HAD ANY THOUGHTS OF KILLING YOURSELF?: NO
1. IN THE PAST MONTH, HAVE YOU WISHED YOU WERE DEAD OR WISHED YOU COULD GO TO SLEEP AND NOT WAKE UP?: NO
6. HAVE YOU EVER DONE ANYTHING, STARTED TO DO ANYTHING, OR PREPARED TO DO ANYTHING TO END YOUR LIFE?: NO

## 2024-03-29 ASSESSMENT — PAIN DESCRIPTION - DESCRIPTORS: DESCRIPTORS: ACHING

## 2024-03-29 NOTE — PATIENT INSTRUCTIONS
Today :We administered (ketamine 30 mg, lidocaine (Xylocaine) 20 mg/mL (2 %) 300 mg in sodium chloride 0.9% 500 mL IV) and propofol (Diprivan) 100 mg in dextrose 5 % in water (D5W) 25 mL.     For:   1. Fibromyalgia         Your next appointment is due in:  2 weeks  [facility]      Pain Infusion Aftercare Instructions      1. It is normal to feel sedated, tired and low in energy after a pain infusion. DO NOT DRIVE, OPERATE ANY MACHINERY, OR MAKE ANY IMPORTANT DECISIONS FOR AT LEAST 24 HOURS AFTER THE INFUSION.     2. Call the pain center at [phone] with any problems, questions, or concerns.     3. Eat light after the infusion. If you feel queasy or sick to your stomach, laying down with your eyes closed may help. When you resume eating start with something mild like clear liquids, yogurt, applesauce, crackers, etc… Gradually advance to a regular diet.     4. Do not leave your house alone the evening of your pain infusion.     5. No alcohol or sedative medications, such as sleeping pills, for 24 hours after your pain infusion.     6. Resume all other prescribed medications unless directed otherwise by you physician.     7. If you have any medical emergencies, call 911 or go directly to the closest emergency room.        Please read the  Medication Guide that was given to you and reviewed during todays visit.     (Tell all doctors including dentists that you are taking this medication)     Go to the emergency room or call 911 if:  -You have signs of allergic reaction:   -Rash, hives, itching.   -Swollen, blistered, peeling skin.   -Swelling of face, lips, mouth, tongue or throat.   -Tightness of chest, trouble breathing, swallowing or talking     Call your doctor:  - If IV / injection site gets red, warm, swollen, itchy or leaks fluid or pus.     (Leave dressing on your IV site for at least 2 hours and keep area clean and dry  - If you get sick or have symptoms of infection or are not  feeling well for any reason.    (Wash your hands often, stay away from people who are sick)  - If you have side effects from your medication that do not go away or are bothersome.     (Refer to the teaching your nurse gave you for side effects to call your doctor about)    - Common side effects may include:  stuffy nose, headache, feeling tired, muscle aches, upset stomach  - Before receiving any vaccines     - Call [facility] at   If:  - You get sick, are on antibiotics, have had a recent vaccine, have surgery or dental work and your doctor wants your visit rescheduled.  - You need to cancel and reschedule your visit for any reason. Call at least 2 days before your visit if you need to cancel.   - Your insurance changes before your next visit.    (We will need to get approval from your new insurance. This can take up to two weeks.)     [facility] is opened Monday thru Friday. We are closed on weekends and holidays.   Voice mail will take your call if the center is closed. If you leave a message please allow 24 hours for a call back during weekdays. If you leave a message on a weekend/holiday, we will call you back the next business day.

## 2024-03-29 NOTE — PROGRESS NOTES
S: Patient here for 15 opioid sparing pain infusion. Patient reports 75% reduction in pain after last infusion that lasted 2 days.    Purpose of pain infusion meds explained along with potential side effects.  Patient verbalized understanding.    B: Pain Issues 9/ 10 lower back    A: Patient currently has pain described on flow sheet documentation. Designated  is Cordae More. Patient last ate solid food 4 hours ago, and had liquid 1 hours ago.    R: Plan; Obtain IV access, do patient risk assessment, and start opioid sparing infusion as ordered. Monitoring for S/S of adverse reactions.

## 2024-04-10 DIAGNOSIS — M79.7 FIBROMYALGIA: Primary | ICD-10-CM

## 2024-04-16 ENCOUNTER — INFUSION (OUTPATIENT)
Dept: INFUSION THERAPY | Facility: CLINIC | Age: 44
End: 2024-04-16
Payer: MEDICARE

## 2024-04-16 VITALS
SYSTOLIC BLOOD PRESSURE: 103 MMHG | HEART RATE: 80 BPM | RESPIRATION RATE: 12 BRPM | DIASTOLIC BLOOD PRESSURE: 70 MMHG | OXYGEN SATURATION: 97 %

## 2024-04-16 DIAGNOSIS — M79.7 FIBROMYALGIA: ICD-10-CM

## 2024-04-16 PROCEDURE — 96375 TX/PRO/DX INJ NEW DRUG ADDON: CPT | Mod: INF

## 2024-04-16 PROCEDURE — 96365 THER/PROPH/DIAG IV INF INIT: CPT | Mod: INF

## 2024-04-16 PROCEDURE — 2500000004 HC RX 250 GENERAL PHARMACY W/ HCPCS (ALT 636 FOR OP/ED): Performed by: NURSE PRACTITIONER

## 2024-04-16 PROCEDURE — 2500000005 HC RX 250 GENERAL PHARMACY W/O HCPCS: Performed by: NURSE PRACTITIONER

## 2024-04-16 PROCEDURE — 96368 THER/DIAG CONCURRENT INF: CPT | Mod: INF

## 2024-04-16 PROCEDURE — 2500000004 HC RX 250 GENERAL PHARMACY W/ HCPCS (ALT 636 FOR OP/ED)

## 2024-04-16 RX ORDER — METOCLOPRAMIDE HYDROCHLORIDE 5 MG/ML
10 INJECTION INTRAMUSCULAR; INTRAVENOUS ONCE
Status: CANCELLED | OUTPATIENT
Start: 2024-04-19 | End: 2024-04-19

## 2024-04-16 RX ORDER — ONDANSETRON HYDROCHLORIDE 2 MG/ML
4 INJECTION, SOLUTION INTRAVENOUS ONCE
Status: CANCELLED | OUTPATIENT
Start: 2024-04-19 | End: 2024-04-19

## 2024-04-16 RX ORDER — EPINEPHRINE 0.3 MG/.3ML
0.3 INJECTION SUBCUTANEOUS EVERY 5 MIN PRN
Status: CANCELLED | OUTPATIENT
Start: 2024-04-19

## 2024-04-16 RX ORDER — ONDANSETRON HYDROCHLORIDE 2 MG/ML
INJECTION, SOLUTION INTRAVENOUS
Status: COMPLETED
Start: 2024-04-16 | End: 2024-04-16

## 2024-04-16 RX ORDER — DIPHENHYDRAMINE HYDROCHLORIDE 50 MG/ML
25 INJECTION INTRAMUSCULAR; INTRAVENOUS ONCE
Status: CANCELLED | OUTPATIENT
Start: 2024-04-19 | End: 2024-04-19

## 2024-04-16 RX ORDER — FAMOTIDINE 10 MG/ML
20 INJECTION INTRAVENOUS ONCE AS NEEDED
Status: CANCELLED | OUTPATIENT
Start: 2024-04-19

## 2024-04-16 RX ORDER — DIPHENHYDRAMINE HYDROCHLORIDE 50 MG/ML
50 INJECTION INTRAMUSCULAR; INTRAVENOUS AS NEEDED
Status: CANCELLED | OUTPATIENT
Start: 2024-04-19

## 2024-04-16 RX ORDER — ALBUTEROL SULFATE 0.83 MG/ML
3 SOLUTION RESPIRATORY (INHALATION) AS NEEDED
Status: CANCELLED | OUTPATIENT
Start: 2024-04-19

## 2024-04-16 RX ORDER — METOPROLOL TARTRATE 25 MG/1
25 TABLET, FILM COATED ORAL ONCE
Status: CANCELLED | OUTPATIENT
Start: 2024-04-19 | End: 2024-04-19

## 2024-04-16 RX ORDER — NITROGLYCERIN 0.4 MG/1
0.4 TABLET SUBLINGUAL ONCE
Status: CANCELLED | OUTPATIENT
Start: 2024-04-19 | End: 2024-04-19

## 2024-04-16 RX ORDER — ONDANSETRON HYDROCHLORIDE 2 MG/ML
4 INJECTION, SOLUTION INTRAVENOUS ONCE
Status: COMPLETED | OUTPATIENT
Start: 2024-04-16 | End: 2024-04-16

## 2024-04-16 RX ADMIN — Medication: at 15:13

## 2024-04-16 RX ADMIN — PROPOFOL 100 MG: 10 INJECTION, EMULSION INTRAVENOUS at 15:11

## 2024-04-16 RX ADMIN — ONDANSETRON 4 MG: 2 INJECTION INTRAMUSCULAR; INTRAVENOUS at 15:44

## 2024-04-16 RX ADMIN — ONDANSETRON HYDROCHLORIDE 4 MG: 2 INJECTION, SOLUTION INTRAVENOUS at 15:44

## 2024-04-16 ASSESSMENT — PAIN SCALES - GENERAL
PAINLEVEL_OUTOF10: 0 - NO PAIN
PAINLEVEL_OUTOF10: 9

## 2024-04-16 ASSESSMENT — PAIN DESCRIPTION - DESCRIPTORS: DESCRIPTORS: ACHING

## 2024-04-16 ASSESSMENT — PAIN - FUNCTIONAL ASSESSMENT
PAIN_FUNCTIONAL_ASSESSMENT: 0-10
PAIN_FUNCTIONAL_ASSESSMENT: 0-10

## 2024-04-16 ASSESSMENT — ENCOUNTER SYMPTOMS
OCCASIONAL FEELINGS OF UNSTEADINESS: 1
DEPRESSION: 1
LOSS OF SENSATION IN FEET: 1

## 2024-04-16 NOTE — PATIENT INSTRUCTIONS
Today :We administered (ketamine 30 mg, lidocaine (Xylocaine) 20 mg/mL (2 %) 300 mg in sodium chloride 0.9% 500 mL IV) and propofol (Diprivan) 100 mg in dextrose 5 % in water (D5W) 25 mL.     For:   1. Fibromyalgia              (Tell all doctors including dentists that you are taking this medication)     Go to the emergency room or call 911 if:  -You have signs of allergic reaction:   -Rash, hives, itching.   -Swollen, blistered, peeling skin.   -Swelling of face, lips, mouth, tongue or throat.   -Tightness of chest, trouble breathing, swallowing or talking     Call your doctor:  - If IV / injection site gets red, warm, swollen, itchy or leaks fluid or pus.     (Leave dressing on your IV site for at least 2 hours and keep area clean and dry  - If you get sick or have symptoms of infection or are not feeling well for any reason.    (Wash your hands often, stay away from people who are sick)  - If you have side effects from your medication that do not go away or are bothersome.     (Refer to the teaching your nurse gave you for side effects to call your doctor about)    - Common side effects may include:  stuffy nose, headache, feeling tired, muscle aches, upset stomach  - Before receiving any vaccines     - Call [facility] at   If:  - You get sick, are on antibiotics, have had a recent vaccine, have surgery or dental work and your doctor wants your visit rescheduled.  - You need to cancel and reschedule your visit for any reason. Call at least 2 days before your visit if you need to cancel.   - Your insurance changes before your next visit.    (We will need to get approval from your new insurance. This can take up to two weeks.)     [facility] is opened Monday thru Friday. We are closed on weekends and holidays.   Voice mail will take your call if the center is closed. If you leave a message please allow 24 hours for a call back during weekdays. If you leave a message on a  weekend/holiday, we will call you back the next business day.              [facility]      Pain Infusion Aftercare Instructions      1. It is normal to feel sedated, tired and low in energy after a pain infusion. DO NOT DRIVE, OPERATE ANY MACHINERY, OR MAKE ANY IMPORTANT DECISIONS FOR AT LEAST 24 HOURS AFTER THE INFUSION.     2. Call the pain center at [phone] with any problems, questions, or concerns.     3. Eat light after the infusion. If you feel queasy or sick to your stomach, laying down with your eyes closed may help. When you resume eating start with something mild like clear liquids, yogurt, applesauce, crackers, etc… Gradually advance to a regular diet.     4. Do not leave your house alone the evening of your pain infusion.     5. No alcohol or sedative medications, such as sleeping pills, for 24 hours after your pain infusion.     6. Resume all other prescribed medications unless directed otherwise by you physician.     7. If you have any medical emergencies, call 911 or go directly to the closest emergency room.

## 2024-04-16 NOTE — PROGRESS NOTES
S: Patient here for 16th opioid sparing pain infusion. Patient reports 80% reduction in pain after last infusion that lasted 2 days.    Purpose of pain infusion meds explained along with potential side effects.  Patient verbalized understanding.    B: Pain Issues: low back pain    A: Patient currently has pain described on flow sheet documentation. Designated  is . Patient last ate solid food 6 hours ago, and had liquid 4 hours ago.    R: Plan; Obtain IV access, do patient risk assessment, and start opioid sparing infusion as ordered. Monitoring for S/S of adverse reactions.

## 2024-04-23 ENCOUNTER — TELEMEDICINE (OUTPATIENT)
Dept: PAIN MEDICINE | Facility: CLINIC | Age: 44
End: 2024-04-23
Payer: MEDICARE

## 2024-04-23 DIAGNOSIS — M54.42 CHRONIC BILATERAL LOW BACK PAIN WITH BILATERAL SCIATICA: ICD-10-CM

## 2024-04-23 DIAGNOSIS — M54.41 CHRONIC BILATERAL LOW BACK PAIN WITH BILATERAL SCIATICA: ICD-10-CM

## 2024-04-23 DIAGNOSIS — M79.7 FIBROMYALGIA: Primary | ICD-10-CM

## 2024-04-23 DIAGNOSIS — G89.29 CHRONIC BILATERAL LOW BACK PAIN WITH BILATERAL SCIATICA: ICD-10-CM

## 2024-04-23 PROCEDURE — 99213 OFFICE O/P EST LOW 20 MIN: CPT | Performed by: NURSE PRACTITIONER

## 2024-04-23 RX ORDER — LIDOCAINE 50 MG/G
PATCH TOPICAL
Qty: 30 PATCH | Refills: 2 | Status: SHIPPED | OUTPATIENT
Start: 2024-04-23

## 2024-04-23 RX ORDER — KETOROLAC TROMETHAMINE 30 MG/ML
30 INJECTION, SOLUTION INTRAMUSCULAR; INTRAVENOUS ONCE
Status: CANCELLED | OUTPATIENT
Start: 2024-04-30 | End: 2024-04-30

## 2024-04-23 RX ORDER — GABAPENTIN 300 MG/1
300 CAPSULE ORAL 3 TIMES DAILY
Qty: 90 CAPSULE | Refills: 0 | Status: SHIPPED | OUTPATIENT
Start: 2024-04-23 | End: 2024-06-11

## 2024-04-23 ASSESSMENT — ENCOUNTER SYMPTOMS
BACK PAIN: 1
NUMBNESS: 1
WHEEZING: 0
CHILLS: 0
ARTHRALGIAS: 1
DEPRESSION: 0
NAUSEA: 0
MYALGIAS: 1
DIARRHEA: 0
CONFUSION: 0
CHEST TIGHTNESS: 0
SHORTNESS OF BREATH: 0
HEADACHES: 1
LOSS OF SENSATION IN FEET: 0
AGITATION: 0
DIFFICULTY URINATING: 0
WEAKNESS: 0
FREQUENCY: 0
PALPITATIONS: 0
OCCASIONAL FEELINGS OF UNSTEADINESS: 0
CONSTIPATION: 0
FATIGUE: 0

## 2024-04-23 ASSESSMENT — PAIN - FUNCTIONAL ASSESSMENT: PAIN_FUNCTIONAL_ASSESSMENT: 0-10

## 2024-04-23 ASSESSMENT — PAIN SCALES - GENERAL: PAINLEVEL_OUTOF10: 8

## 2024-04-23 ASSESSMENT — PAIN DESCRIPTION - DESCRIPTORS: DESCRIPTORS: ACHING;BURNING;RADIATING;SHARP

## 2024-04-23 NOTE — PROGRESS NOTES
Virtual or Telephone Consent    An interactive audio and video telecommunication system which permits real time communications between the patient (at the originating site) and provider (at the distant site) was utilized to provide this telehealth service.   Verbal consent was requested and obtained from Iraida La on this date, 04/23/24 for a telehealth visit.      Chief Complain  Follow-up visit for widespread body pain, who receives IV infusion therapy every 3 weeks which provides   50-60% relief for 2 weeks.     History Of Present Illness  Iraida La is a 43 y.o. female here for chronic lower back pain radiating to bilateral lower extremities. 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  The patient describes pain as sharp, dull, aching, radiating, throbbing.  The pain is worsened by bending forward, standing, walking, lifting, twisting, going up stairs, and squatting and is alleviated by medications nonsteroidal anti-inflammatory drugs and Tylenol, position change, sitting, lying down, and IV infusion therapy .  Since the last visit the pain has stayed the same.    The patient denies any fever, chills, weight loss, weakness, bladder/ bowel incontinence, history of cancer, history of IV drug abuse, recent trauma.     Prior office visit:  1/31/2024 Iraida La is a 43 y.o. female recall past medical history of [facility] injury in 2017 was with Dr. Estrella and with Sandeep used to be on hydrocodone that is done and case closed, he has fibromyalgia, and lumbar pain and on disability. She is being treated with IV infusion therapy every 3 weeks which provides 80% relief in symptoms for 3 to 4 days, then symptoms slowly return if she increases her activity and ADLs. She is also using methocarbamol, lidocaine patches and Topamax which provide moderate relief in symptoms.  She denies medication side effects.  Of note  patient is in  for left upper and lower extremity weakness, patient rates reports a stroke has been ruled out and her symptoms are related to long-haul COVID nerve damage and muscle weakness.  Patient reports plans for physical therapy once discharged.  Advised patient about [facility] long-haul COVID program.  Discussed treatment and IV infusion and given the results the patient receives a plan to continue with IV infusion every 3 weeks for fibromyalgia pain.  The infusion therapy allows her to complete her ADLs and improve her quality of life.  All questions and concerns answered.  Plan to follow-up in 3 months or sooner if needed.     Portions of record reviewed for pertinent issues: active problem list, medication list, allergies, family history, social history, notes from last encounter, encounters, lab results, imaging and other available records.      I have personally reviewed the [facility] report for this patient. This report is scanned into the electronic medical record. I have considered the risks of abuse, dependence, addiction and diversion. It showed: Gabapentin from Dr. Balderas    Past Medical History  She has no past medical history on file.    Surgical History  She has no past surgical history on file.     Social History  She reports that she has been smoking cigarettes. She has been exposed to tobacco smoke. She has never used smokeless tobacco. She reports that she does not currently use alcohol after a past usage of about 1.0 standard drink of alcohol per week. She reports that she does not currently use drugs.    Family History  Family History   Problem Relation Name Age of Onset    No Known Problems Mother      No Known Problems Father          Allergies  Aspirin, Erythromycin, Salicylates, Erythromycin base, Peanut, and Toradol [ketorolac]    Review of Systems  Review of Systems   Constitutional:  Negative for chills and fatigue.   Respiratory:  Negative for chest  tightness, shortness of breath and wheezing.    Cardiovascular:  Negative for chest pain and palpitations.   Gastrointestinal:  Negative for constipation, diarrhea and nausea.   Genitourinary:  Negative for difficulty urinating and frequency.   Musculoskeletal:  Positive for arthralgias, back pain and myalgias.        Lower back pain radiates to bilateral lower extremities   Neurological:  Positive for numbness and headaches. Negative for weakness.   Psychiatric/Behavioral:  Negative for agitation, confusion and suicidal ideas.         Physical Exam  Physical Exam  Constitutional:       General: She is not in acute distress.     Appearance: Normal appearance.   HENT:      Head: Normocephalic.   Eyes:      Extraocular Movements: Extraocular movements intact.   Cardiovascular:      Rate and Rhythm: Normal rate.   Pulmonary:      Effort: Pulmonary effort is normal.   Neurological:      General: No focal deficit present.      Mental Status: She is alert and oriented to person, place, and time.   Psychiatric:         Mood and Affect: Mood normal.         Behavior: Behavior normal.           Last Recorded Vitals  Virtual visit    Reviewed Images      Reviewed Labs  Lab Results   Component Value Date    GLUCOSE 82 10/11/2022    CALCIUM 8.5 (L) 10/11/2022     10/11/2022    K 3.4 (L) 10/11/2022    CO2 24 10/11/2022     10/11/2022    BUN 10 10/11/2022    CREATININE 0.64 10/11/2022           Assessment/Plan     Iraida La is a 43 y.o. female recall past medical history of [facility] injury in 2017 was with Dr. Estrella and with Sandeep used to be on hydrocodone that is done and case closed, he has fibromyalgia, and lumbar pain and on disability. She is being treated with IV infusion therapy every 3 weeks which provides 60-70% relief in lower back pain radiating to bilateral lower extremities for 10 to 12 days depending on activity level.  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  Overall she is doing well and is pleased with the progress she is making.  She denies any new neurological constitutional symptoms.  She denies bowel or bladder incontinence or saddle paresthesia.  She reports the numbness and tingling comes and goes.  She occasionally uses cane to stabilize her gait and assist with ambulation.  She denies any recent falls.  She scheduled to start aqua therapy.  She continues with gabapentin, lidocaine patches, Tylenol, and Flexeril which provides significant relief in symptoms.  She denies medication side effects.  Provided refill of gabapentin and lidocaine patches.  Given the relief IV infusion therapy provides we will continue with IV infusion therapy every 3 weeks for fibromyalgia pain.  New orders signed.  All questions and concerns answered.  Plan to follow-up in 3 months or sooner if needed.      Plan  At least 50% of the visit was involved in the discussion of the options for treatment. We discussed exercises, medication, interventional therapies and surgery. Healthy life style is essential with patient hard work to achieve the wellness. In addition; discussion with the patient and/or family about any of the diagnostic results, impressions and/or recommended diagnostic studies, prognosis, risks and benefits of treatment options, instructions for treatment and/or follow-up, importance of compliance with chosen treatment options, risk-factor reduction, and patient/family education.          *Please note this report has been produced using speech recognition software and may contain errors related to that system including grammar, punctuation and spelling as well as words and phrases that may be inappropriate. If there are questions or concerns, please feel free to contact me to clarify.      I spent 20 minutes in the professional and overall care of this patient.       Jefferson Mckee, JO-CNP

## 2024-04-25 DIAGNOSIS — M79.7 FIBROMYALGIA: Primary | ICD-10-CM

## 2024-04-30 ENCOUNTER — INFUSION (OUTPATIENT)
Dept: INFUSION THERAPY | Facility: CLINIC | Age: 44
End: 2024-04-30
Payer: MEDICARE

## 2024-04-30 VITALS
SYSTOLIC BLOOD PRESSURE: 96 MMHG | TEMPERATURE: 97.3 F | DIASTOLIC BLOOD PRESSURE: 65 MMHG | OXYGEN SATURATION: 96 % | RESPIRATION RATE: 16 BRPM | HEART RATE: 86 BPM

## 2024-04-30 DIAGNOSIS — M79.7 FIBROMYALGIA: ICD-10-CM

## 2024-04-30 PROCEDURE — 2500000004 HC RX 250 GENERAL PHARMACY W/ HCPCS (ALT 636 FOR OP/ED): Performed by: NURSE PRACTITIONER

## 2024-04-30 PROCEDURE — 2500000004 HC RX 250 GENERAL PHARMACY W/ HCPCS (ALT 636 FOR OP/ED)

## 2024-04-30 PROCEDURE — 96375 TX/PRO/DX INJ NEW DRUG ADDON: CPT | Mod: INF

## 2024-04-30 PROCEDURE — 2500000005 HC RX 250 GENERAL PHARMACY W/O HCPCS: Performed by: NURSE PRACTITIONER

## 2024-04-30 PROCEDURE — 96365 THER/PROPH/DIAG IV INF INIT: CPT | Mod: INF

## 2024-04-30 PROCEDURE — 96368 THER/DIAG CONCURRENT INF: CPT | Mod: INF

## 2024-04-30 RX ORDER — METOPROLOL TARTRATE 25 MG/1
25 TABLET, FILM COATED ORAL ONCE
Status: CANCELLED | OUTPATIENT
Start: 2024-05-21 | End: 2024-05-21

## 2024-04-30 RX ORDER — ALBUTEROL SULFATE 0.83 MG/ML
3 SOLUTION RESPIRATORY (INHALATION) AS NEEDED
Status: CANCELLED | OUTPATIENT
Start: 2024-05-21

## 2024-04-30 RX ORDER — KETOROLAC TROMETHAMINE 30 MG/ML
INJECTION, SOLUTION INTRAMUSCULAR; INTRAVENOUS
Status: DISCONTINUED
Start: 2024-04-30 | End: 2024-04-30 | Stop reason: WASHOUT

## 2024-04-30 RX ORDER — DIPHENHYDRAMINE HYDROCHLORIDE 50 MG/ML
25 INJECTION INTRAMUSCULAR; INTRAVENOUS ONCE
Status: CANCELLED | OUTPATIENT
Start: 2024-05-21 | End: 2024-05-21

## 2024-04-30 RX ORDER — FAMOTIDINE 10 MG/ML
20 INJECTION INTRAVENOUS ONCE AS NEEDED
Status: CANCELLED | OUTPATIENT
Start: 2024-04-30

## 2024-04-30 RX ORDER — DIPHENHYDRAMINE HYDROCHLORIDE 50 MG/ML
25 INJECTION INTRAMUSCULAR; INTRAVENOUS ONCE
Status: CANCELLED | OUTPATIENT
Start: 2024-04-30 | End: 2024-04-30

## 2024-04-30 RX ORDER — DIPHENHYDRAMINE HYDROCHLORIDE 50 MG/ML
50 INJECTION INTRAMUSCULAR; INTRAVENOUS AS NEEDED
Status: CANCELLED | OUTPATIENT
Start: 2024-04-30

## 2024-04-30 RX ORDER — EPINEPHRINE 0.3 MG/.3ML
0.3 INJECTION SUBCUTANEOUS EVERY 5 MIN PRN
Status: CANCELLED | OUTPATIENT
Start: 2024-04-30

## 2024-04-30 RX ORDER — METOPROLOL TARTRATE 25 MG/1
25 TABLET, FILM COATED ORAL ONCE
Status: CANCELLED | OUTPATIENT
Start: 2024-04-30 | End: 2024-04-30

## 2024-04-30 RX ORDER — ONDANSETRON HYDROCHLORIDE 2 MG/ML
INJECTION, SOLUTION INTRAVENOUS
Status: COMPLETED
Start: 2024-04-30 | End: 2024-04-30

## 2024-04-30 RX ORDER — EPINEPHRINE 0.3 MG/.3ML
0.3 INJECTION SUBCUTANEOUS EVERY 5 MIN PRN
Status: CANCELLED | OUTPATIENT
Start: 2024-05-21

## 2024-04-30 RX ORDER — ONDANSETRON HYDROCHLORIDE 2 MG/ML
4 INJECTION, SOLUTION INTRAVENOUS ONCE
Status: CANCELLED | OUTPATIENT
Start: 2024-04-30 | End: 2024-04-30

## 2024-04-30 RX ORDER — METOCLOPRAMIDE HYDROCHLORIDE 5 MG/ML
10 INJECTION INTRAMUSCULAR; INTRAVENOUS ONCE
Status: CANCELLED | OUTPATIENT
Start: 2024-04-30 | End: 2024-04-30

## 2024-04-30 RX ORDER — METOCLOPRAMIDE HYDROCHLORIDE 5 MG/ML
10 INJECTION INTRAMUSCULAR; INTRAVENOUS ONCE
Status: CANCELLED | OUTPATIENT
Start: 2024-05-21 | End: 2024-05-21

## 2024-04-30 RX ORDER — ONDANSETRON HYDROCHLORIDE 2 MG/ML
4 INJECTION, SOLUTION INTRAVENOUS ONCE
Status: CANCELLED | OUTPATIENT
Start: 2024-05-21 | End: 2024-05-21

## 2024-04-30 RX ORDER — DIPHENHYDRAMINE HYDROCHLORIDE 50 MG/ML
50 INJECTION INTRAMUSCULAR; INTRAVENOUS AS NEEDED
Status: CANCELLED | OUTPATIENT
Start: 2024-05-21

## 2024-04-30 RX ORDER — ALBUTEROL SULFATE 0.83 MG/ML
3 SOLUTION RESPIRATORY (INHALATION) AS NEEDED
Status: CANCELLED | OUTPATIENT
Start: 2024-04-30

## 2024-04-30 RX ORDER — NITROGLYCERIN 0.4 MG/1
0.4 TABLET SUBLINGUAL ONCE
Status: CANCELLED | OUTPATIENT
Start: 2024-05-21 | End: 2024-05-21

## 2024-04-30 RX ORDER — FAMOTIDINE 10 MG/ML
20 INJECTION INTRAVENOUS ONCE AS NEEDED
Status: CANCELLED | OUTPATIENT
Start: 2024-05-21

## 2024-04-30 RX ORDER — NITROGLYCERIN 0.4 MG/1
0.4 TABLET SUBLINGUAL ONCE
Status: CANCELLED | OUTPATIENT
Start: 2024-04-30 | End: 2024-04-30

## 2024-04-30 RX ADMIN — ONDANSETRON 4 MG: 2 INJECTION INTRAMUSCULAR; INTRAVENOUS at 15:12

## 2024-04-30 RX ADMIN — Medication: at 15:12

## 2024-04-30 RX ADMIN — PROPOFOL 100 MG: 10 INJECTION, EMULSION INTRAVENOUS at 15:12

## 2024-04-30 ASSESSMENT — PAIN SCALES - GENERAL
PAINLEVEL: 10-WORST PAIN EVER
PAINLEVEL_OUTOF10: 10 - WORST POSSIBLE PAIN
PAINLEVEL_OUTOF10: 0 - NO PAIN

## 2024-04-30 ASSESSMENT — PAIN - FUNCTIONAL ASSESSMENT
PAIN_FUNCTIONAL_ASSESSMENT: 0-10
PAIN_FUNCTIONAL_ASSESSMENT: 0-10

## 2024-04-30 ASSESSMENT — PAIN DESCRIPTION - DESCRIPTORS: DESCRIPTORS: ACHING

## 2024-04-30 ASSESSMENT — ENCOUNTER SYMPTOMS
LOSS OF SENSATION IN FEET: 1
DEPRESSION: 1
OCCASIONAL FEELINGS OF UNSTEADINESS: 1

## 2024-04-30 NOTE — PROGRESS NOTES
S: Patient here for 17 opioid sparing pain infusion. Patient reports 90% reduction in pain after last infusion that lasted 3 days.    Purpose of pain infusion meds explained along with potential side effects.  Patient verbalized understanding.    B: Pain Issues 10/10 right lower back    A: Patient currently has pain described on flow sheet documentation. Designated  is xaitment. Patient last ate solid food 4 hours ago, and had liquid 1 hours ago.    R: Plan; Obtain IV access, do patient risk assessment, and start opioid sparing infusion as ordered. Monitoring for S/S of adverse reactions.

## 2024-04-30 NOTE — PATIENT INSTRUCTIONS
Today :We administered ondansetron, (ketamine 30 mg, lidocaine (Xylocaine) 20 mg/mL (2 %) 300 mg in sodium chloride 0.9% 500 mL IV), and propofol (Diprivan) 100 mg in dextrose 5 % in water (D5W) 25 mL.     For:   1. Fibromyalgia         Your next appointment is due in:  3 weeks  [facility]      Pain Infusion Aftercare Instructions      1. It is normal to feel sedated, tired and low in energy after a pain infusion. DO NOT DRIVE, OPERATE ANY MACHINERY, OR MAKE ANY IMPORTANT DECISIONS FOR AT LEAST 24 HOURS AFTER THE INFUSION.     2. Call the pain center at [phone] with any problems, questions, or concerns.     3. Eat light after the infusion. If you feel queasy or sick to your stomach, laying down with your eyes closed may help. When you resume eating start with something mild like clear liquids, yogurt, applesauce, crackers, etc… Gradually advance to a regular diet.     4. Do not leave your house alone the evening of your pain infusion.     5. No alcohol or sedative medications, such as sleeping pills, for 24 hours after your pain infusion.     6. Resume all other prescribed medications unless directed otherwise by you physician.     7. If you have any medical emergencies, call 911 or go directly to the closest emergency room.        Please read the  Medication Guide that was given to you and reviewed during todays visit.     (Tell all doctors including dentists that you are taking this medication)     Go to the emergency room or call 911 if:  -You have signs of allergic reaction:   -Rash, hives, itching.   -Swollen, blistered, peeling skin.   -Swelling of face, lips, mouth, tongue or throat.   -Tightness of chest, trouble breathing, swallowing or talking     Call your doctor:  - If IV / injection site gets red, warm, swollen, itchy or leaks fluid or pus.     (Leave dressing on your IV site for at least 2 hours and keep area clean and dry  - If you get sick or have symptoms of  infection or are not feeling well for any reason.    (Wash your hands often, stay away from people who are sick)  - If you have side effects from your medication that do not go away or are bothersome.     (Refer to the teaching your nurse gave you for side effects to call your doctor about)    - Common side effects may include:  stuffy nose, headache, feeling tired, muscle aches, upset stomach  - Before receiving any vaccines     - [facility] at   If:  - You get sick, are on antibiotics, have had a recent vaccine, have surgery or dental work and your doctor wants your visit rescheduled.  - You need to cancel and reschedule your visit for any reason. Call at least 2 days before your visit if you need to cancel.   - Your insurance changes before your next visit.    (We will need to get approval from your new insurance. This can take up to two weeks.)     [facility] is opened Monday thru Friday. We are closed on weekends and holidays.   Voice mail will take your call if the center is closed. If you leave a message please allow 24 hours for a call back during weekdays. If you leave a message on a weekend/holiday, we will call you back the next business day.

## 2024-05-14 ENCOUNTER — APPOINTMENT (OUTPATIENT)
Dept: INFUSION THERAPY | Facility: CLINIC | Age: 44
End: 2024-05-14
Payer: COMMERCIAL

## 2024-05-16 DIAGNOSIS — M79.7 FIBROMYALGIA: Primary | ICD-10-CM

## 2024-05-21 DIAGNOSIS — M79.7 FIBROMYALGIA: Primary | ICD-10-CM

## 2024-05-21 RX ORDER — EPINEPHRINE 0.3 MG/.3ML
0.3 INJECTION SUBCUTANEOUS EVERY 5 MIN PRN
Status: CANCELLED | OUTPATIENT
Start: 2024-05-21

## 2024-05-21 RX ORDER — ALBUTEROL SULFATE 0.83 MG/ML
3 SOLUTION RESPIRATORY (INHALATION) AS NEEDED
Status: CANCELLED | OUTPATIENT
Start: 2024-05-21

## 2024-05-21 RX ORDER — DIPHENHYDRAMINE HYDROCHLORIDE 50 MG/ML
50 INJECTION INTRAMUSCULAR; INTRAVENOUS AS NEEDED
Status: CANCELLED | OUTPATIENT
Start: 2024-05-21

## 2024-05-21 RX ORDER — FAMOTIDINE 10 MG/ML
20 INJECTION INTRAVENOUS ONCE AS NEEDED
Status: CANCELLED | OUTPATIENT
Start: 2024-05-21

## 2024-05-28 ENCOUNTER — INFUSION (OUTPATIENT)
Dept: INFUSION THERAPY | Facility: CLINIC | Age: 44
End: 2024-05-28
Payer: MEDICARE

## 2024-05-28 VITALS
HEART RATE: 80 BPM | DIASTOLIC BLOOD PRESSURE: 68 MMHG | RESPIRATION RATE: 16 BRPM | TEMPERATURE: 97.5 F | OXYGEN SATURATION: 97 % | SYSTOLIC BLOOD PRESSURE: 107 MMHG

## 2024-05-28 DIAGNOSIS — M79.7 FIBROMYALGIA: ICD-10-CM

## 2024-05-28 PROCEDURE — 96375 TX/PRO/DX INJ NEW DRUG ADDON: CPT | Mod: INF

## 2024-05-28 PROCEDURE — 2500000005 HC RX 250 GENERAL PHARMACY W/O HCPCS: Performed by: ANESTHESIOLOGY

## 2024-05-28 PROCEDURE — 2500000004 HC RX 250 GENERAL PHARMACY W/ HCPCS (ALT 636 FOR OP/ED)

## 2024-05-28 PROCEDURE — 96365 THER/PROPH/DIAG IV INF INIT: CPT | Mod: INF

## 2024-05-28 PROCEDURE — 2500000004 HC RX 250 GENERAL PHARMACY W/ HCPCS (ALT 636 FOR OP/ED): Performed by: ANESTHESIOLOGY

## 2024-05-28 PROCEDURE — 96368 THER/DIAG CONCURRENT INF: CPT | Mod: INF

## 2024-05-28 RX ORDER — METOPROLOL TARTRATE 25 MG/1
25 TABLET, FILM COATED ORAL ONCE
OUTPATIENT
Start: 2024-06-04 | End: 2024-06-04

## 2024-05-28 RX ORDER — EPINEPHRINE 0.3 MG/.3ML
0.3 INJECTION SUBCUTANEOUS EVERY 5 MIN PRN
OUTPATIENT
Start: 2024-06-04

## 2024-05-28 RX ORDER — FAMOTIDINE 10 MG/ML
20 INJECTION INTRAVENOUS ONCE AS NEEDED
OUTPATIENT
Start: 2024-06-04

## 2024-05-28 RX ORDER — DIPHENHYDRAMINE HYDROCHLORIDE 50 MG/ML
50 INJECTION INTRAMUSCULAR; INTRAVENOUS AS NEEDED
OUTPATIENT
Start: 2024-06-04

## 2024-05-28 RX ORDER — ONDANSETRON HYDROCHLORIDE 2 MG/ML
4 INJECTION, SOLUTION INTRAVENOUS ONCE
OUTPATIENT
Start: 2024-06-04 | End: 2024-06-04

## 2024-05-28 RX ORDER — METOCLOPRAMIDE HYDROCHLORIDE 5 MG/ML
10 INJECTION INTRAMUSCULAR; INTRAVENOUS ONCE
OUTPATIENT
Start: 2024-06-04 | End: 2024-06-04

## 2024-05-28 RX ORDER — ONDANSETRON HYDROCHLORIDE 2 MG/ML
4 INJECTION, SOLUTION INTRAVENOUS ONCE
Status: COMPLETED | OUTPATIENT
Start: 2024-05-28 | End: 2024-05-28

## 2024-05-28 RX ORDER — DIPHENHYDRAMINE HYDROCHLORIDE 50 MG/ML
25 INJECTION INTRAMUSCULAR; INTRAVENOUS ONCE
OUTPATIENT
Start: 2024-06-04 | End: 2024-06-04

## 2024-05-28 RX ORDER — ONDANSETRON HYDROCHLORIDE 2 MG/ML
INJECTION, SOLUTION INTRAVENOUS
Status: COMPLETED
Start: 2024-05-28 | End: 2024-05-28

## 2024-05-28 RX ORDER — NITROGLYCERIN 0.4 MG/1
0.4 TABLET SUBLINGUAL ONCE
OUTPATIENT
Start: 2024-06-04 | End: 2024-06-04

## 2024-05-28 RX ORDER — ALBUTEROL SULFATE 0.83 MG/ML
3 SOLUTION RESPIRATORY (INHALATION) AS NEEDED
OUTPATIENT
Start: 2024-06-04

## 2024-05-28 RX ADMIN — ONDANSETRON 4 MG: 2 INJECTION INTRAMUSCULAR; INTRAVENOUS at 16:04

## 2024-05-28 RX ADMIN — PROPOFOL 100 MG: 10 INJECTION, EMULSION INTRAVENOUS at 16:03

## 2024-05-28 RX ADMIN — ONDANSETRON HYDROCHLORIDE 4 MG: 2 INJECTION, SOLUTION INTRAVENOUS at 16:04

## 2024-05-28 RX ADMIN — Medication: at 16:02

## 2024-05-28 ASSESSMENT — PAIN SCALES - GENERAL
PAINLEVEL_OUTOF10: 0 - NO PAIN
PAINLEVEL_OUTOF10: 10 - WORST POSSIBLE PAIN

## 2024-05-28 ASSESSMENT — ENCOUNTER SYMPTOMS
DEPRESSION: 1
LOSS OF SENSATION IN FEET: 1
OCCASIONAL FEELINGS OF UNSTEADINESS: 1

## 2024-05-28 NOTE — PROGRESS NOTES
S: Patient here for 18th opioid sparing pain infusion. Patient reports 75% reduction in pain after last infusion that lasted 2 days.    Purpose of pain infusion meds explained along with potential side effects.  Patient verbalized understanding.    B: Pain Issues in lower back and right side    A: Patient currently has pain described on flow sheet documentation. Designated  is [employer] at [phone]. Patient last ate solid food 6 hours ago, and had liquid 1 hours ago.    R: Plan; Obtain IV access, do patient risk assessment, and start opioid sparing infusion as ordered. Monitoring for S/S of adverse reactions.    1641-Post infusion teaching provided. Patient verbalized understanding. VSS, Patient states pain is 0. Will assist patient to waiting car via wheelchair.

## 2024-05-28 NOTE — PATIENT INSTRUCTIONS
Today :Iraida La had no medications administered during this visit.     For:   1. Fibromyalgia            (Tell all doctors including dentists that you are taking this medication)     Go to the emergency room or call 911 if:  -You have signs of allergic reaction:   -Rash, hives, itching.   -Swollen, blistered, peeling skin.   -Swelling of face, lips, mouth, tongue or throat.   -Tightness of chest, trouble breathing, swallowing or talking     Call your doctor:  - If IV / injection site gets red, warm, swollen, itchy or leaks fluid or pus.     (Leave dressing on your IV site for at least 2 hours and keep area clean and dry  - If you get sick or have symptoms of infection or are not feeling well for any reason.    (Wash your hands often, stay away from people who are sick)  - If you have side effects from your medication that do not go away or are bothersome.     (Refer to the teaching your nurse gave you for side effects to call your doctor about)    - Common side effects may include:  stuffy nose, headache, feeling tired, muscle aches, upset stomach  - Before receiving any vaccines     - Call [facility] at   If:  - You get sick, are on antibiotics, have had a recent vaccine, have surgery or dental work and your doctor wants your visit rescheduled.  - You need to cancel and reschedule your visit for any reason. Call at least 2 days before your visit if you need to cancel.   - Your insurance changes before your next visit.    (We will need to get approval from your new insurance. This can take up to two weeks.)     [facility] is opened Monday thru Friday. We are closed on weekends and holidays.   Voice mail will take your call if the center is closed. If you leave a message please allow 24 hours for a call back during weekdays. If you leave a message on a weekend/holiday, we will call you back the next business day.              [facility] [place]      Pain Infusion  Aftercare Instructions      1. It is normal to feel sedated, tired and low in energy after a pain infusion. DO NOT DRIVE, OPERATE ANY MACHINERY, OR MAKE ANY IMPORTANT DECISIONS FOR AT LEAST 24 HOURS AFTER THE INFUSION.     2. Call the pain center at [phone] with any problems, questions, or concerns.     3. Eat light after the infusion. If you feel queasy or sick to your stomach, laying down with your eyes closed may help. When you resume eating start with something mild like clear liquids, yogurt, applesauce, crackers, etc… Gradually advance to a regular diet.     4. Do not leave your house alone the evening of your pain infusion.     5. No alcohol or sedative medications, such as sleeping pills, for 24 hours after your pain infusion.     6. Resume all other prescribed medications unless directed otherwise by you physician.     7. If you have any medical emergencies, call 911 or go directly to the closest emergency room.

## 2024-06-18 ENCOUNTER — INFUSION (OUTPATIENT)
Dept: INFUSION THERAPY | Facility: CLINIC | Age: 44
End: 2024-06-18
Payer: COMMERCIAL

## 2024-06-18 VITALS
SYSTOLIC BLOOD PRESSURE: 106 MMHG | OXYGEN SATURATION: 97 % | RESPIRATION RATE: 17 BRPM | DIASTOLIC BLOOD PRESSURE: 69 MMHG | HEART RATE: 89 BPM

## 2024-06-18 DIAGNOSIS — M79.7 FIBROMYALGIA: Primary | ICD-10-CM

## 2024-06-18 PROCEDURE — 2500000005 HC RX 250 GENERAL PHARMACY W/O HCPCS: Performed by: NURSE PRACTITIONER

## 2024-06-18 PROCEDURE — 96365 THER/PROPH/DIAG IV INF INIT: CPT | Mod: INF

## 2024-06-18 PROCEDURE — 96368 THER/DIAG CONCURRENT INF: CPT | Mod: INF

## 2024-06-18 PROCEDURE — 2500000004 HC RX 250 GENERAL PHARMACY W/ HCPCS (ALT 636 FOR OP/ED)

## 2024-06-18 PROCEDURE — 96375 TX/PRO/DX INJ NEW DRUG ADDON: CPT | Mod: INF

## 2024-06-18 PROCEDURE — 2500000004 HC RX 250 GENERAL PHARMACY W/ HCPCS (ALT 636 FOR OP/ED): Performed by: NURSE PRACTITIONER

## 2024-06-18 RX ORDER — ONDANSETRON HYDROCHLORIDE 2 MG/ML
4 INJECTION, SOLUTION INTRAVENOUS ONCE
Status: COMPLETED | OUTPATIENT
Start: 2024-06-18 | End: 2024-06-18

## 2024-06-18 RX ORDER — NITROGLYCERIN 0.4 MG/1
0.4 TABLET SUBLINGUAL ONCE
OUTPATIENT
Start: 2024-07-02 | End: 2024-07-02

## 2024-06-18 RX ORDER — FAMOTIDINE 10 MG/ML
20 INJECTION INTRAVENOUS ONCE AS NEEDED
OUTPATIENT
Start: 2024-07-02

## 2024-06-18 RX ORDER — DIPHENHYDRAMINE HYDROCHLORIDE 50 MG/ML
50 INJECTION INTRAMUSCULAR; INTRAVENOUS AS NEEDED
OUTPATIENT
Start: 2024-07-02

## 2024-06-18 RX ORDER — METOCLOPRAMIDE HYDROCHLORIDE 5 MG/ML
10 INJECTION INTRAMUSCULAR; INTRAVENOUS ONCE
OUTPATIENT
Start: 2024-07-02 | End: 2024-07-02

## 2024-06-18 RX ORDER — METOPROLOL TARTRATE 25 MG/1
25 TABLET, FILM COATED ORAL ONCE
OUTPATIENT
Start: 2024-07-02 | End: 2024-07-02

## 2024-06-18 RX ORDER — ALBUTEROL SULFATE 0.83 MG/ML
3 SOLUTION RESPIRATORY (INHALATION) AS NEEDED
OUTPATIENT
Start: 2024-07-02

## 2024-06-18 RX ORDER — ONDANSETRON HYDROCHLORIDE 2 MG/ML
4 INJECTION, SOLUTION INTRAVENOUS ONCE
OUTPATIENT
Start: 2024-07-02 | End: 2024-07-02

## 2024-06-18 RX ORDER — EPINEPHRINE 0.3 MG/.3ML
0.3 INJECTION SUBCUTANEOUS EVERY 5 MIN PRN
OUTPATIENT
Start: 2024-07-02

## 2024-06-18 RX ORDER — DIPHENHYDRAMINE HYDROCHLORIDE 50 MG/ML
25 INJECTION INTRAMUSCULAR; INTRAVENOUS ONCE
OUTPATIENT
Start: 2024-07-02 | End: 2024-07-02

## 2024-06-18 RX ORDER — ONDANSETRON HYDROCHLORIDE 2 MG/ML
INJECTION, SOLUTION INTRAVENOUS
Status: COMPLETED
Start: 2024-06-18 | End: 2024-06-18

## 2024-06-18 ASSESSMENT — ENCOUNTER SYMPTOMS
OCCASIONAL FEELINGS OF UNSTEADINESS: 1
DEPRESSION: 1
LOSS OF SENSATION IN FEET: 1

## 2024-06-18 ASSESSMENT — PAIN SCALES - GENERAL
PAINLEVEL_OUTOF10: 10 - WORST POSSIBLE PAIN
PAINLEVEL_OUTOF10: 0 - NO PAIN

## 2024-06-18 NOTE — PATIENT INSTRUCTIONS
Today :We administered (ketamine 30 mg, lidocaine (Xylocaine) 20 mg/mL (2 %) 300 mg in sodium chloride 0.9% 500 mL IV), propofol (Diprivan) 100 mg in dextrose 5% 25 mL, ondansetron, and ondansetron.     For:   1. Fibromyalgia            (Tell all doctors including dentists that you are taking this medication)     Go to the emergency room or call 911 if:  -You have signs of allergic reaction:   -Rash, hives, itching.   -Swollen, blistered, peeling skin.   -Swelling of face, lips, mouth, tongue or throat.   -Tightness of chest, trouble breathing, swallowing or talking     Call your doctor:  - If IV / injection site gets red, warm, swollen, itchy or leaks fluid or pus.     (Leave dressing on your IV site for at least 2 hours and keep area clean and dry  - If you get sick or have symptoms of infection or are not feeling well for any reason.    (Wash your hands often, stay away from people who are sick)  - If you have side effects from your medication that do not go away or are bothersome.     (Refer to the teaching your nurse gave you for side effects to call your doctor about)    - Common side effects may include:  stuffy nose, headache, feeling tired, muscle aches, upset stomach  - Before receiving any vaccines     - Call [facility] at   If:  - You get sick, are on antibiotics, have had a recent vaccine, have surgery or dental work and your doctor wants your visit rescheduled.  - You need to cancel and reschedule your visit for any reason. Call at least 2 days before your visit if you need to cancel.   - Your insurance changes before your next visit.    (We will need to get approval from your new insurance. This can take up to two weeks.)     [facility] is opened Monday thru Friday. We are closed on weekends and holidays.   Voice mail will take your call if the center is closed. If you leave a message please allow 24 hours for a call back during weekdays. If you leave a message on a  weekend/holiday, we will call you back the next business day.              [facility]      Pain Infusion Aftercare Instructions      1. It is normal to feel sedated, tired and low in energy after a pain infusion. DO NOT DRIVE, OPERATE ANY MACHINERY, OR MAKE ANY IMPORTANT DECISIONS FOR AT LEAST 24 HOURS AFTER THE INFUSION.     2. Call the pain center at [phone] with any problems, questions, or concerns.     3. Eat light after the infusion. If you feel queasy or sick to your stomach, laying down with your eyes closed may help. When you resume eating start with something mild like clear liquids, yogurt, applesauce, crackers, etc… Gradually advance to a regular diet.     4. Do not leave your house alone the evening of your pain infusion.     5. No alcohol or sedative medications, such as sleeping pills, for 24 hours after your pain infusion.     6. Resume all other prescribed medications unless directed otherwise by you physician.     7. If you have any medical emergencies, call 911 or go directly to the closest emergency room.

## 2024-06-18 NOTE — PROGRESS NOTES
S: Patient here for 19th opioid sparing pain infusion. Patient reports 85% reduction in pain after last infusion that lasted 2 days.    Purpose of pain infusion meds explained along with potential side effects.  Patient verbalized understanding.    B: Pain Issues in shoulder, lower back    A: Patient currently has pain described on flow sheet documentation. Designated  is Eliseo Cowart at [phone]. Patient last ate solid food 6 hours ago, and had liquid 1 hours ago.    R: Plan; Obtain IV access, do patient risk assessment, and start opioid sparing infusion as ordered. Monitoring for S/S of adverse reactions.    1543- Post infusion teaching provided. Patient verbalized understanding. VSS, Patient states pain is 0. Will assist patient to waiting car via wheelchair.

## 2024-06-21 ENCOUNTER — APPOINTMENT (OUTPATIENT)
Dept: INFUSION THERAPY | Facility: CLINIC | Age: 44
End: 2024-06-21
Payer: COMMERCIAL

## 2024-06-24 ENCOUNTER — APPOINTMENT (OUTPATIENT)
Dept: OBSTETRICS AND GYNECOLOGY | Facility: CLINIC | Age: 44
End: 2024-06-24
Payer: COMMERCIAL

## 2024-06-24 VITALS
DIASTOLIC BLOOD PRESSURE: 68 MMHG | BODY MASS INDEX: 34.55 KG/M2 | WEIGHT: 215 LBS | HEIGHT: 66 IN | SYSTOLIC BLOOD PRESSURE: 106 MMHG

## 2024-06-24 DIAGNOSIS — Z72.0 DECLINED SMOKING CESSATION: ICD-10-CM

## 2024-06-24 DIAGNOSIS — Z11.3 SCREEN FOR STD (SEXUALLY TRANSMITTED DISEASE): ICD-10-CM

## 2024-06-24 DIAGNOSIS — Z71.6 ENCOUNTER FOR SMOKING CESSATION COUNSELING: ICD-10-CM

## 2024-06-24 DIAGNOSIS — Z01.419 WOMEN'S ANNUAL ROUTINE GYNECOLOGICAL EXAMINATION: Primary | ICD-10-CM

## 2024-06-24 LAB
POC BLOOD, URINE: NEGATIVE
POC GLUCOSE, URINE: NEGATIVE MG/DL
POC KETONES, URINE: NEGATIVE MG/DL
POC LEUKOCYTES, URINE: NEGATIVE
POC NITRITE,URINE: NEGATIVE
POC PROTEIN, URINE: NEGATIVE MG/DL

## 2024-06-24 PROCEDURE — 87491 CHLMYD TRACH DNA AMP PROBE: CPT

## 2024-06-24 PROCEDURE — 87661 TRICHOMONAS VAGINALIS AMPLIF: CPT

## 2024-06-24 PROCEDURE — 87591 N.GONORRHOEAE DNA AMP PROB: CPT

## 2024-06-24 PROCEDURE — 81003 URINALYSIS AUTO W/O SCOPE: CPT | Performed by: OBSTETRICS & GYNECOLOGY

## 2024-06-24 PROCEDURE — 87624 HPV HI-RISK TYP POOLED RSLT: CPT

## 2024-06-24 PROCEDURE — 88175 CYTOPATH C/V AUTO FLUID REDO: CPT

## 2024-06-24 PROCEDURE — 99396 PREV VISIT EST AGE 40-64: CPT | Performed by: OBSTETRICS & GYNECOLOGY

## 2024-06-24 PROCEDURE — 99406 BEHAV CHNG SMOKING 3-10 MIN: CPT | Performed by: OBSTETRICS & GYNECOLOGY

## 2024-06-24 ASSESSMENT — ENCOUNTER SYMPTOMS
RESPIRATORY NEGATIVE: 0
ARTHRALGIAS: 1
BACK PAIN: 1
NUMBNESS: 1
PSYCHIATRIC NEGATIVE: 0
MUSCULOSKELETAL NEGATIVE: 0
JOINT SWELLING: 1
ALLERGIC/IMMUNOLOGIC NEGATIVE: 0
ENDOCRINE NEGATIVE: 0
OCCASIONAL FEELINGS OF UNSTEADINESS: 0
CHILLS: 1
ABDOMINAL PAIN: 1
FATIGUE: 1
NEUROLOGICAL NEGATIVE: 0
DIZZINESS: 1
CARDIOVASCULAR NEGATIVE: 0
NAUSEA: 1
CONSTITUTIONAL NEGATIVE: 0
VOMITING: 1
HEADACHES: 1
UNEXPECTED WEIGHT CHANGE: 1
DEPRESSION: 0
LOSS OF SENSATION IN FEET: 0
EYES NEGATIVE: 0
GASTROINTESTINAL NEGATIVE: 0
HEMATOLOGIC/LYMPHATIC NEGATIVE: 0
DIARRHEA: 1

## 2024-06-24 ASSESSMENT — PAIN SCALES - GENERAL: PAINLEVEL: 5

## 2024-06-24 NOTE — PROGRESS NOTES
"Iraida La is a 44 y.o. female who is here for a routine exam. PCP = Camden Balderas MD  Patient here for annual exam no new complaints.  Still having painful periods and some vague abdominal pain off-and-on.  This has been a multiyear issue.  Workup has been negative.  Patient is sexually active with steady partnerChart review along with previous notes    Review of Systems   Constitutional:  Positive for chills, fatigue and unexpected weight change.   Gastrointestinal:  Positive for abdominal pain, diarrhea, nausea and vomiting.   Genitourinary:  Positive for menstrual problem.   Musculoskeletal:  Positive for arthralgias, back pain and joint swelling.   Neurological:  Positive for dizziness, numbness and headaches.   Psychiatric/Behavioral:          Anxiety     OBGyn Exam    Objective   /68   Ht 1.676 m (5' 6\")   Wt 97.5 kg (215 lb)   LMP 2023   BMI 34.70 kg/m²   OB History          4    Para   4    Term                AB        Living             SAB        IAB        Ectopic        Multiple        Live Births                      GynHx:  Menarche/Menopause: 14  Periods are regular every 28-30 days, lasting 5 days.  Dysmenorrhea: mild, occurring first 1-2 days of flow.   Cyclic symptoms include none.    Social History     Substance and Sexual Activity   Sexual Activity Yes    Partners: Male    Birth control/protection: None     Current contraception: Tubal Sterilization  STIs: none    Substance:   Tobacco Use: High Risk (2024)    Patient History     Smoking Tobacco Use: Some Days     Smokeless Tobacco Use: Never     Passive Exposure: Past      Social History     Substance and Sexual Activity   Drug Use Not Currently      Social History     Substance and Sexual Activity   Alcohol Use Not Currently    Alcohol/week: 1.0 standard drink of alcohol    Types: 1 Glasses of wine per week     Abuse: No  Depression Screen:   neg    Past med hx and past surg hx reviewed and " notable for: chronic pain, GI issues    Assessment/Plan    Negative annual exam. Will order STD screen. Refer back to GI. Discussed diet, exercise.  % min dicussion to quit tobacco. Not interested at present

## 2024-06-26 LAB
C TRACH RRNA SPEC QL NAA+PROBE: NEGATIVE
C TRACH RRNA SPEC QL NAA+PROBE: NEGATIVE
N GONORRHOEA DNA SPEC QL PROBE+SIG AMP: NEGATIVE
T VAGINALIS RRNA SPEC QL NAA+PROBE: NEGATIVE

## 2024-07-01 RX ORDER — DIPHENHYDRAMINE HYDROCHLORIDE 50 MG/ML
50 INJECTION INTRAMUSCULAR; INTRAVENOUS AS NEEDED
Status: CANCELLED | OUTPATIENT
Start: 2024-07-09

## 2024-07-01 RX ORDER — ALBUTEROL SULFATE 0.83 MG/ML
3 SOLUTION RESPIRATORY (INHALATION) AS NEEDED
Status: CANCELLED | OUTPATIENT
Start: 2024-07-09

## 2024-07-01 RX ORDER — FAMOTIDINE 10 MG/ML
20 INJECTION INTRAVENOUS ONCE AS NEEDED
Status: CANCELLED | OUTPATIENT
Start: 2024-07-09

## 2024-07-01 RX ORDER — EPINEPHRINE 0.3 MG/.3ML
0.3 INJECTION SUBCUTANEOUS EVERY 5 MIN PRN
Status: CANCELLED | OUTPATIENT
Start: 2024-07-09

## 2024-07-09 ENCOUNTER — INFUSION (OUTPATIENT)
Dept: INFUSION THERAPY | Facility: CLINIC | Age: 44
End: 2024-07-09
Payer: COMMERCIAL

## 2024-07-09 VITALS
OXYGEN SATURATION: 98 % | DIASTOLIC BLOOD PRESSURE: 60 MMHG | HEART RATE: 79 BPM | RESPIRATION RATE: 13 BRPM | SYSTOLIC BLOOD PRESSURE: 99 MMHG | TEMPERATURE: 98.6 F

## 2024-07-09 DIAGNOSIS — M79.7 FIBROMYALGIA: Primary | ICD-10-CM

## 2024-07-09 PROCEDURE — 2500000004 HC RX 250 GENERAL PHARMACY W/ HCPCS (ALT 636 FOR OP/ED): Performed by: NURSE PRACTITIONER

## 2024-07-09 PROCEDURE — 2500000005 HC RX 250 GENERAL PHARMACY W/O HCPCS: Performed by: NURSE PRACTITIONER

## 2024-07-09 PROCEDURE — 96368 THER/DIAG CONCURRENT INF: CPT | Mod: INF

## 2024-07-09 PROCEDURE — 96375 TX/PRO/DX INJ NEW DRUG ADDON: CPT | Mod: INF

## 2024-07-09 PROCEDURE — 96365 THER/PROPH/DIAG IV INF INIT: CPT | Mod: INF

## 2024-07-09 RX ORDER — FAMOTIDINE 10 MG/ML
20 INJECTION INTRAVENOUS ONCE AS NEEDED
OUTPATIENT
Start: 2024-07-23

## 2024-07-09 RX ORDER — ALBUTEROL SULFATE 0.83 MG/ML
3 SOLUTION RESPIRATORY (INHALATION) AS NEEDED
OUTPATIENT
Start: 2024-07-23

## 2024-07-09 RX ORDER — ONDANSETRON HYDROCHLORIDE 2 MG/ML
4 INJECTION, SOLUTION INTRAVENOUS ONCE
Status: COMPLETED | OUTPATIENT
Start: 2024-07-09 | End: 2024-07-09

## 2024-07-09 RX ORDER — EPINEPHRINE 0.3 MG/.3ML
0.3 INJECTION SUBCUTANEOUS EVERY 5 MIN PRN
OUTPATIENT
Start: 2024-07-23

## 2024-07-09 RX ORDER — NITROGLYCERIN 0.4 MG/1
0.4 TABLET SUBLINGUAL ONCE
OUTPATIENT
Start: 2024-07-23 | End: 2024-07-23

## 2024-07-09 RX ORDER — DIPHENHYDRAMINE HYDROCHLORIDE 50 MG/ML
50 INJECTION INTRAMUSCULAR; INTRAVENOUS AS NEEDED
OUTPATIENT
Start: 2024-07-23

## 2024-07-09 RX ORDER — DIPHENHYDRAMINE HYDROCHLORIDE 50 MG/ML
25 INJECTION INTRAMUSCULAR; INTRAVENOUS ONCE
OUTPATIENT
Start: 2024-07-23 | End: 2024-07-23

## 2024-07-09 RX ORDER — METOPROLOL TARTRATE 25 MG/1
25 TABLET, FILM COATED ORAL ONCE
OUTPATIENT
Start: 2024-07-23 | End: 2024-07-23

## 2024-07-09 RX ORDER — ONDANSETRON HYDROCHLORIDE 2 MG/ML
4 INJECTION, SOLUTION INTRAVENOUS ONCE
OUTPATIENT
Start: 2024-07-23 | End: 2024-07-23

## 2024-07-09 RX ORDER — METOCLOPRAMIDE HYDROCHLORIDE 5 MG/ML
10 INJECTION INTRAMUSCULAR; INTRAVENOUS ONCE
OUTPATIENT
Start: 2024-07-23 | End: 2024-07-23

## 2024-07-09 ASSESSMENT — PAIN SCALES - GENERAL: PAINLEVEL: 8

## 2024-07-09 ASSESSMENT — ENCOUNTER SYMPTOMS
DEPRESSION: 1
LOSS OF SENSATION IN FEET: 1
OCCASIONAL FEELINGS OF UNSTEADINESS: 1

## 2024-07-09 NOTE — PROGRESS NOTES
S: Patient here for 20th opioid sparing pain infusion. Patient reports 80% reduction in pain after last infusion that lasted 3 days.    Purpose of pain infusion meds explained along with potential side effects.  Patient verbalized understanding.    B: Pain Issues middle lower back    A: Patient currently has pain described on flow sheet documentation. Designated  is [employer]. Patient last ate solid food 8 hours ago, and had liquid 1 hours ago.    R: Plan; Obtain IV access, do patient risk assessment, and start opioid sparing infusion as ordered. Monitoring for S/S of adverse reactions.    Post infusion teaching provided. Patient verbalized understanding. VSS, Patient states pain is 0/10. Will assist patient to waiting car via wheelchair.

## 2024-07-09 NOTE — PATIENT INSTRUCTIONS
Today :We administered (ketamine 30 mg, lidocaine (Xylocaine) 20 mg/mL (2 %) 300 mg in sodium chloride 0.9% 500 mL IV), propofol (Diprivan) 100 mg in dextrose 5% 25 mL, and ondansetron.     For:   1. Fibromyalgia         (Tell all doctors including dentists that you are taking this medication)     Go to the emergency room or call 911 if:  -You have signs of allergic reaction:   -Rash, hives, itching.   -Swollen, blistered, peeling skin.   -Swelling of face, lips, mouth, tongue or throat.   -Tightness of chest, trouble breathing, swallowing or talking     Call your doctor:  - If IV / injection site gets red, warm, swollen, itchy or leaks fluid or pus.     (Leave dressing on your IV site for at least 2 hours and keep area clean and dry  - If you get sick or have symptoms of infection or are not feeling well for any reason.    (Wash your hands often, stay away from people who are sick)  - If you have side effects from your medication that do not go away or are bothersome.     (Refer to the teaching your nurse gave you for side effects to call your doctor about)    - Common side effects may include:  stuffy nose, headache, feeling tired, muscle aches, upset stomach  - Before receiving any vaccines     - [facility] at   If:  - You get sick, are on antibiotics, have had a recent vaccine, have surgery or dental work and your doctor wants your visit rescheduled.  - You need to cancel and reschedule your visit for any reason. Call at least 2 days before your visit if you need to cancel.   - Your insurance changes before your next visit.    (We will need to get approval from your new insurance. This can take up to two weeks.)     [facility] is opened Monday thru Friday. We are closed on weekends and holidays.   Voice mail will take your call if the center is closed. If you leave a message please allow 24 hours for a call back during weekdays. If you leave a message on a weekend/holiday, we  will call you back the next business day.              [facility]      Pain Infusion Aftercare Instructions      1. It is normal to feel sedated, tired and low in energy after a pain infusion. DO NOT DRIVE, OPERATE ANY MACHINERY, OR MAKE ANY IMPORTANT DECISIONS FOR AT LEAST 24 HOURS AFTER THE INFUSION.     2. Call the pain center at [phone] with any problems, questions, or concerns.     3. Eat light after the infusion. If you feel queasy or sick to your stomach, laying down with your eyes closed may help. When you resume eating start with something mild like clear liquids, yogurt, applesauce, crackers, etc… Gradually advance to a regular diet.     4. Do not leave your house alone the evening of your pain infusion.     5. No alcohol or sedative medications, such as sleeping pills, for 24 hours after your pain infusion.     6. Resume all other prescribed medications unless directed otherwise by you physician.     7. If you have any medical emergencies, call 911 or go directly to the closest emergency room.

## 2024-07-10 LAB
CYTOLOGY CMNT CVX/VAG CYTO-IMP: NORMAL
HPV HR 12 DNA GENITAL QL NAA+PROBE: NEGATIVE
HPV HR GENOTYPES PNL CVX NAA+PROBE: NEGATIVE
HPV16 DNA SPEC QL NAA+PROBE: NEGATIVE
HPV18 DNA SPEC QL NAA+PROBE: NEGATIVE
LAB AP HPV GENOTYPE QUESTION: YES
LAB AP HPV HR: NORMAL
LAB AP PAP ADDITIONAL TESTS: NORMAL
LABORATORY COMMENT REPORT: NORMAL
PATH REPORT.TOTAL CANCER: NORMAL

## 2024-07-11 ENCOUNTER — APPOINTMENT (OUTPATIENT)
Dept: INFUSION THERAPY | Facility: CLINIC | Age: 44
End: 2024-07-11
Payer: COMMERCIAL

## 2024-07-31 DIAGNOSIS — M79.7 FIBROMYALGIA: Primary | ICD-10-CM

## 2024-07-31 RX ORDER — KETAMINE HCL IN NACL, ISO-OSM 100MG/10ML
SYRINGE (ML) INJECTION ONCE
Status: CANCELLED | OUTPATIENT
Start: 2024-08-02

## 2024-08-02 ENCOUNTER — INFUSION (OUTPATIENT)
Dept: INFUSION THERAPY | Facility: CLINIC | Age: 44
End: 2024-08-02
Payer: COMMERCIAL

## 2024-08-02 VITALS
TEMPERATURE: 97 F | DIASTOLIC BLOOD PRESSURE: 70 MMHG | HEART RATE: 80 BPM | SYSTOLIC BLOOD PRESSURE: 110 MMHG | OXYGEN SATURATION: 97 % | RESPIRATION RATE: 16 BRPM

## 2024-08-02 DIAGNOSIS — M79.7 FIBROMYALGIA: ICD-10-CM

## 2024-08-02 PROCEDURE — 96365 THER/PROPH/DIAG IV INF INIT: CPT | Mod: INF

## 2024-08-02 PROCEDURE — 2500000005 HC RX 250 GENERAL PHARMACY W/O HCPCS: Performed by: NURSE PRACTITIONER

## 2024-08-02 PROCEDURE — 96368 THER/DIAG CONCURRENT INF: CPT | Mod: INF

## 2024-08-02 PROCEDURE — 2500000004 HC RX 250 GENERAL PHARMACY W/ HCPCS (ALT 636 FOR OP/ED): Performed by: NURSE PRACTITIONER

## 2024-08-02 PROCEDURE — 96375 TX/PRO/DX INJ NEW DRUG ADDON: CPT | Mod: INF

## 2024-08-02 RX ORDER — METOPROLOL TARTRATE 25 MG/1
25 TABLET, FILM COATED ORAL ONCE
OUTPATIENT
Start: 2024-08-23 | End: 2024-08-23

## 2024-08-02 RX ORDER — ALBUTEROL SULFATE 0.83 MG/ML
3 SOLUTION RESPIRATORY (INHALATION) AS NEEDED
OUTPATIENT
Start: 2024-08-23

## 2024-08-02 RX ORDER — ONDANSETRON HYDROCHLORIDE 2 MG/ML
4 INJECTION, SOLUTION INTRAVENOUS ONCE
OUTPATIENT
Start: 2024-08-23 | End: 2024-08-23

## 2024-08-02 RX ORDER — DIPHENHYDRAMINE HYDROCHLORIDE 50 MG/ML
50 INJECTION INTRAMUSCULAR; INTRAVENOUS AS NEEDED
OUTPATIENT
Start: 2024-08-23

## 2024-08-02 RX ORDER — ONDANSETRON HYDROCHLORIDE 2 MG/ML
4 INJECTION, SOLUTION INTRAVENOUS ONCE
Status: COMPLETED | OUTPATIENT
Start: 2024-08-02 | End: 2024-08-02

## 2024-08-02 RX ORDER — EPINEPHRINE 0.3 MG/.3ML
0.3 INJECTION SUBCUTANEOUS EVERY 5 MIN PRN
OUTPATIENT
Start: 2024-08-23

## 2024-08-02 RX ORDER — DIPHENHYDRAMINE HYDROCHLORIDE 50 MG/ML
25 INJECTION INTRAMUSCULAR; INTRAVENOUS ONCE
OUTPATIENT
Start: 2024-08-23 | End: 2024-08-23

## 2024-08-02 RX ORDER — METOCLOPRAMIDE HYDROCHLORIDE 5 MG/ML
10 INJECTION INTRAMUSCULAR; INTRAVENOUS ONCE
OUTPATIENT
Start: 2024-08-23 | End: 2024-08-23

## 2024-08-02 RX ORDER — KETAMINE HCL IN NACL, ISO-OSM 100MG/10ML
SYRINGE (ML) INJECTION ONCE
Status: COMPLETED | OUTPATIENT
Start: 2024-08-02 | End: 2024-08-02

## 2024-08-02 RX ORDER — NITROGLYCERIN 0.4 MG/1
0.4 TABLET SUBLINGUAL ONCE
OUTPATIENT
Start: 2024-08-23 | End: 2024-08-23

## 2024-08-02 RX ORDER — FAMOTIDINE 10 MG/ML
20 INJECTION INTRAVENOUS ONCE AS NEEDED
OUTPATIENT
Start: 2024-08-23

## 2024-08-02 RX ORDER — KETAMINE HCL IN NACL, ISO-OSM 100MG/10ML
SYRINGE (ML) INJECTION ONCE
Status: CANCELLED | OUTPATIENT
Start: 2024-08-23

## 2024-08-02 ASSESSMENT — PAIN SCALES - GENERAL
PAINLEVEL_OUTOF10: 10 - WORST POSSIBLE PAIN
PAINLEVEL_OUTOF10: 0 - NO PAIN
PAINLEVEL: 10-WORST PAIN EVER

## 2024-08-02 ASSESSMENT — ENCOUNTER SYMPTOMS
OCCASIONAL FEELINGS OF UNSTEADINESS: 1
DEPRESSION: 1
LOSS OF SENSATION IN FEET: 1

## 2024-08-02 ASSESSMENT — PAIN - FUNCTIONAL ASSESSMENT
PAIN_FUNCTIONAL_ASSESSMENT: 0-10
PAIN_FUNCTIONAL_ASSESSMENT: 0-10

## 2024-08-02 NOTE — PROGRESS NOTES
S: Patient here for 21 opioid sparing pain infusion. Patient reports 75% reduction in pain after last infusion that lasted 2 days.    Purpose of pain infusion meds explained along with potential side effects.  Patient verbalized understanding.    B: Pain Issues 10/10 lower right back    A: Patient currently has pain described on flow sheet documentation. Designated  is Eliseo @ [phone]. Patient last ate solid food 4 hours ago, and had liquid 1 hours ago.    R: Plan; Obtain IV access, do patient risk assessment, and start opioid sparing infusion as ordered. Monitoring for S/S of adverse reactions.

## 2024-08-02 NOTE — PATIENT INSTRUCTIONS
Today :We administered ondansetron, ketamine 30 mg-lidocaine 300 mg, and propofol.     For:   1. Fibromyalgia              Please read the  Medication Guide that was given to you and reviewed during todays visit.     (Tell all doctors including dentists that you are taking this medication)     Go to the emergency room or call 911 if:  -You have signs of allergic reaction:   -Rash, hives, itching.   -Swollen, blistered, peeling skin.   -Swelling of face, lips, mouth, tongue or throat.   -Tightness of chest, trouble breathing, swallowing or talking     Call your doctor:  - If IV / injection site gets red, warm, swollen, itchy or leaks fluid or pus.     (Leave dressing on your IV site for at least 2 hours and keep area clean and dry  - If you get sick or have symptoms of infection or are not feeling well for any reason.    (Wash your hands often, stay away from people who are sick)  - If you have side effects from your medication that do not go away or are bothersome.     (Refer to the teaching your nurse gave you for side effects to call your doctor about)    - Common side effects may include:  stuffy nose, headache, feeling tired, muscle aches, upset stomach  - Before receiving any vaccines     - Call [facility] at   If:  - You get sick, are on antibiotics, have had a recent vaccine, have surgery or dental work and your doctor wants your visit rescheduled.  - You need to cancel and reschedule your visit for any reason. Call at least 2 days before your visit if you need to cancel.   - Your insurance changes before your next visit.    (We will need to get approval from your new insurance. This can take up to two weeks.)     [facility] is opened Monday thru Friday. We are closed on weekends and holidays.   Voice mail will take your call if the center is closed. If you leave a message please allow 24 hours for a call back during weekdays. If you leave a message on a  weekend/holiday, we will call you back the next business day.              [facility]      Pain Infusion Aftercare Instructions      1. It is normal to feel sedated, tired and low in energy after a pain infusion. DO NOT DRIVE, OPERATE ANY MACHINERY, OR MAKE ANY IMPORTANT DECISIONS FOR AT LEAST 24 HOURS AFTER THE INFUSION.     2. Call the pain center at [phone] with any problems, questions, or concerns.     3. Eat light after the infusion. If you feel queasy or sick to your stomach, laying down with your eyes closed may help. When you resume eating start with something mild like clear liquids, yogurt, applesauce, crackers, etc… Gradually advance to a regular diet.     4. Do not leave your house alone the evening of your pain infusion.     5. No alcohol or sedative medications, such as sleeping pills, for 24 hours after your pain infusion.     6. Resume all other prescribed medications unless directed otherwise by you physician.     7. If you have any medical emergencies, call 911 or go directly to the closest emergency room.

## 2024-08-26 ENCOUNTER — INFUSION (OUTPATIENT)
Dept: INFUSION THERAPY | Facility: CLINIC | Age: 44
End: 2024-08-26
Payer: COMMERCIAL

## 2024-08-26 VITALS
TEMPERATURE: 97 F | HEART RATE: 80 BPM | OXYGEN SATURATION: 97 % | DIASTOLIC BLOOD PRESSURE: 68 MMHG | SYSTOLIC BLOOD PRESSURE: 104 MMHG | RESPIRATION RATE: 14 BRPM

## 2024-08-26 DIAGNOSIS — M79.7 FIBROMYALGIA: Primary | ICD-10-CM

## 2024-08-26 PROCEDURE — 2500000004 HC RX 250 GENERAL PHARMACY W/ HCPCS (ALT 636 FOR OP/ED): Performed by: NURSE PRACTITIONER

## 2024-08-26 PROCEDURE — 2500000005 HC RX 250 GENERAL PHARMACY W/O HCPCS: Performed by: NURSE PRACTITIONER

## 2024-08-26 PROCEDURE — 96365 THER/PROPH/DIAG IV INF INIT: CPT | Mod: INF

## 2024-08-26 PROCEDURE — 96375 TX/PRO/DX INJ NEW DRUG ADDON: CPT | Mod: INF

## 2024-08-26 PROCEDURE — 96368 THER/DIAG CONCURRENT INF: CPT | Mod: INF

## 2024-08-26 RX ORDER — FAMOTIDINE 10 MG/ML
20 INJECTION INTRAVENOUS ONCE AS NEEDED
OUTPATIENT
Start: 2024-09-16

## 2024-08-26 RX ORDER — KETAMINE HCL IN NACL, ISO-OSM 100MG/10ML
SYRINGE (ML) INJECTION ONCE
OUTPATIENT
Start: 2024-09-16

## 2024-08-26 RX ORDER — KETAMINE HCL IN NACL, ISO-OSM 100MG/10ML
SYRINGE (ML) INJECTION ONCE
Status: COMPLETED | OUTPATIENT
Start: 2024-08-26 | End: 2024-08-26

## 2024-08-26 RX ORDER — DIPHENHYDRAMINE HYDROCHLORIDE 50 MG/ML
50 INJECTION INTRAMUSCULAR; INTRAVENOUS AS NEEDED
OUTPATIENT
Start: 2024-09-16

## 2024-08-26 RX ORDER — METOCLOPRAMIDE HYDROCHLORIDE 5 MG/ML
10 INJECTION INTRAMUSCULAR; INTRAVENOUS ONCE
OUTPATIENT
Start: 2024-09-16 | End: 2024-09-16

## 2024-08-26 RX ORDER — ONDANSETRON HYDROCHLORIDE 2 MG/ML
4 INJECTION, SOLUTION INTRAVENOUS ONCE
OUTPATIENT
Start: 2024-09-16 | End: 2024-09-16

## 2024-08-26 RX ORDER — NITROGLYCERIN 0.4 MG/1
0.4 TABLET SUBLINGUAL ONCE
OUTPATIENT
Start: 2024-09-16 | End: 2024-09-16

## 2024-08-26 RX ORDER — EPINEPHRINE 0.3 MG/.3ML
0.3 INJECTION SUBCUTANEOUS EVERY 5 MIN PRN
OUTPATIENT
Start: 2024-09-16

## 2024-08-26 RX ORDER — ALBUTEROL SULFATE 0.83 MG/ML
3 SOLUTION RESPIRATORY (INHALATION) AS NEEDED
OUTPATIENT
Start: 2024-09-16

## 2024-08-26 RX ORDER — ONDANSETRON HYDROCHLORIDE 2 MG/ML
4 INJECTION, SOLUTION INTRAVENOUS ONCE
Status: COMPLETED | OUTPATIENT
Start: 2024-08-26 | End: 2024-08-26

## 2024-08-26 RX ORDER — METOPROLOL TARTRATE 25 MG/1
25 TABLET, FILM COATED ORAL ONCE
OUTPATIENT
Start: 2024-09-16 | End: 2024-09-16

## 2024-08-26 RX ORDER — DIPHENHYDRAMINE HYDROCHLORIDE 50 MG/ML
25 INJECTION INTRAMUSCULAR; INTRAVENOUS ONCE
OUTPATIENT
Start: 2024-09-16 | End: 2024-09-16

## 2024-08-26 ASSESSMENT — PAIN - FUNCTIONAL ASSESSMENT
PAIN_FUNCTIONAL_ASSESSMENT: 0-10
PAIN_FUNCTIONAL_ASSESSMENT: 0-10

## 2024-08-26 ASSESSMENT — ENCOUNTER SYMPTOMS
LOSS OF SENSATION IN FEET: 1
OCCASIONAL FEELINGS OF UNSTEADINESS: 1
DEPRESSION: 1

## 2024-08-26 ASSESSMENT — PAIN SCALES - GENERAL
PAINLEVEL_OUTOF10: 8
PAINLEVEL_OUTOF10: 0 - NO PAIN
PAINLEVEL: 8

## 2024-08-26 NOTE — PATIENT INSTRUCTIONS
[facility]      Pain Infusion Aftercare Instructions      1. It is normal to feel sedated, tired and low in energy after a pain infusion. DO NOT DRIVE, OPERATE ANY MACHINERY, OR MAKE ANY IMPORTANT DECISIONS FOR AT LEAST 24 HOURS AFTER THE INFUSION.     2. Call the pain center at [phone] with any problems, questions, or concerns.     3. Eat light after the infusion. If you feel queasy or sick to your stomach, laying down with your eyes closed may help. When you resume eating start with something mild like clear liquids, yogurt, applesauce, crackers, etc… Gradually advance to a regular diet.     4. Do not leave your house alone the evening of your pain infusion.     5. No alcohol or sedative medications, such as sleeping pills, for 24 hours after your pain infusion.     6. Resume all other prescribed medications unless directed otherwise by you physician.     7. If you have any medical emergencies, call 911 or go directly to the closest emergency room.Today :We administered ketamine 30 mg-lidocaine 300 mg, propofol, and ondansetron.     For:   1. Fibromyalgia         Your next appointment is due in:  3 weeks        Please read the  Medication Guide that was given to you and reviewed during todays visit.     (Tell all doctors including dentists that you are taking this medication)     Go to the emergency room or call 911 if:  -You have signs of allergic reaction:   -Rash, hives, itching.   -Swollen, blistered, peeling skin.   -Swelling of face, lips, mouth, tongue or throat.   -Tightness of chest, trouble breathing, swallowing or talking     Call your doctor:  - If IV / injection site gets red, warm, swollen, itchy or leaks fluid or pus.     (Leave dressing on your IV site for at least 2 hours and keep area clean and dry  - If you get sick or have symptoms of infection or are not feeling well for any reason.    (Wash your hands often, stay away from people who are sick)  - If you have  side effects from your medication that do not go away or are bothersome.     (Refer to the teaching your nurse gave you for side effects to call your doctor about)    - Common side effects may include:  stuffy nose, headache, feeling tired, muscle aches, upset stomach  - Before receiving any vaccines     - Call [facility] at   If:  - You get sick, are on antibiotics, have had a recent vaccine, have surgery or dental work and your doctor wants your visit rescheduled.  - You need to cancel and reschedule your visit for any reason. Call at least 2 days before your visit if you need to cancel.   - Your insurance changes before your next visit.    (We will need to get approval from your new insurance. This can take up to two weeks.)     [facility] is opened Monday thru Friday. We are closed on weekends and holidays.   Voice mail will take your call if the center is closed. If you leave a message please allow 24 hours for a call back during weekdays. If you leave a message on a weekend/holiday, we will call you back the next business day.

## 2024-08-26 NOTE — PROGRESS NOTES
S: Patient here for 21 opioid sparing pain infusion. Patient reports 80% reduction in pain after last infusion that lasted 2 days.    Purpose of pain infusion meds explained along with potential side effects.  Patient verbalized understanding.    B: Pain Issues 8/10 lower back     A: Patient currently has pain described on flow sheet documentation. Designated  is . Patient last ate solid food 4 hours ago, and had liquid 1 hours ago.    R: Plan; Obtain IV access, do patient risk assessment, and start opioid sparing infusion as ordered. Monitoring for S/S of adverse reactions.

## 2024-09-05 DIAGNOSIS — M79.7 FIBROMYALGIA: Primary | ICD-10-CM

## 2024-09-05 RX ORDER — KETAMINE HCL IN NACL, ISO-OSM 100MG/10ML
SYRINGE (ML) INJECTION ONCE
OUTPATIENT
Start: 2024-09-10

## 2024-09-10 ENCOUNTER — APPOINTMENT (OUTPATIENT)
Dept: INFUSION THERAPY | Facility: CLINIC | Age: 44
End: 2024-09-10
Payer: COMMERCIAL

## 2024-09-11 DIAGNOSIS — M79.7 FIBROMYALGIA: Primary | ICD-10-CM

## 2024-09-11 RX ORDER — KETAMINE HCL IN NACL, ISO-OSM 100MG/10ML
SYRINGE (ML) INJECTION ONCE
OUTPATIENT
Start: 2024-09-17

## 2024-09-17 ENCOUNTER — HOSPITAL ENCOUNTER (OUTPATIENT)
Dept: RADIOLOGY | Facility: HOSPITAL | Age: 44
Discharge: HOME | End: 2024-09-17
Payer: COMMERCIAL

## 2024-09-17 DIAGNOSIS — D50.9 IRON DEFICIENCY ANEMIA, UNSPECIFIED: ICD-10-CM

## 2024-09-17 PROCEDURE — 76830 TRANSVAGINAL US NON-OB: CPT | Performed by: RADIOLOGY

## 2024-09-17 PROCEDURE — 76830 TRANSVAGINAL US NON-OB: CPT

## 2024-09-17 PROCEDURE — 76856 US EXAM PELVIC COMPLETE: CPT | Performed by: RADIOLOGY

## 2024-10-01 ENCOUNTER — APPOINTMENT (OUTPATIENT)
Dept: INFUSION THERAPY | Facility: CLINIC | Age: 44
End: 2024-10-01
Payer: COMMERCIAL

## 2024-10-02 DIAGNOSIS — M54.41 CHRONIC BILATERAL LOW BACK PAIN WITH BILATERAL SCIATICA: ICD-10-CM

## 2024-10-02 DIAGNOSIS — G89.29 CHRONIC BILATERAL LOW BACK PAIN WITH BILATERAL SCIATICA: ICD-10-CM

## 2024-10-02 DIAGNOSIS — M54.42 CHRONIC BILATERAL LOW BACK PAIN WITH BILATERAL SCIATICA: ICD-10-CM

## 2024-10-02 DIAGNOSIS — M79.7 FIBROMYALGIA: ICD-10-CM

## 2024-10-08 RX ORDER — GABAPENTIN 300 MG/1
300 CAPSULE ORAL 3 TIMES DAILY
Qty: 90 CAPSULE | Refills: 0 | Status: SHIPPED | OUTPATIENT
Start: 2024-10-08

## 2024-10-11 ENCOUNTER — INFUSION (OUTPATIENT)
Dept: INFUSION THERAPY | Facility: CLINIC | Age: 44
End: 2024-10-11
Payer: COMMERCIAL

## 2024-10-11 VITALS
TEMPERATURE: 97.9 F | DIASTOLIC BLOOD PRESSURE: 69 MMHG | RESPIRATION RATE: 14 BRPM | SYSTOLIC BLOOD PRESSURE: 114 MMHG | HEART RATE: 74 BPM | OXYGEN SATURATION: 100 %

## 2024-10-11 DIAGNOSIS — M79.7 FIBROMYALGIA: Primary | ICD-10-CM

## 2024-10-11 PROCEDURE — 96368 THER/DIAG CONCURRENT INF: CPT | Mod: INF

## 2024-10-11 PROCEDURE — 2500000004 HC RX 250 GENERAL PHARMACY W/ HCPCS (ALT 636 FOR OP/ED): Performed by: NURSE PRACTITIONER

## 2024-10-11 PROCEDURE — 96365 THER/PROPH/DIAG IV INF INIT: CPT | Mod: INF

## 2024-10-11 PROCEDURE — 96375 TX/PRO/DX INJ NEW DRUG ADDON: CPT | Mod: INF

## 2024-10-11 RX ORDER — KETAMINE HCL IN NACL, ISO-OSM 100MG/10ML
SYRINGE (ML) INJECTION ONCE
Status: COMPLETED | OUTPATIENT
Start: 2024-10-11 | End: 2024-10-11

## 2024-10-11 RX ORDER — DIPHENHYDRAMINE HYDROCHLORIDE 50 MG/ML
25 INJECTION INTRAMUSCULAR; INTRAVENOUS ONCE
OUTPATIENT
Start: 2024-11-01 | End: 2024-11-01

## 2024-10-11 RX ORDER — METOPROLOL TARTRATE 25 MG/1
25 TABLET, FILM COATED ORAL ONCE
OUTPATIENT
Start: 2024-11-01 | End: 2024-11-01

## 2024-10-11 RX ORDER — METOCLOPRAMIDE HYDROCHLORIDE 5 MG/ML
10 INJECTION INTRAMUSCULAR; INTRAVENOUS ONCE
OUTPATIENT
Start: 2024-11-01 | End: 2024-11-01

## 2024-10-11 RX ORDER — KETAMINE HCL IN NACL, ISO-OSM 100MG/10ML
SYRINGE (ML) INJECTION ONCE
OUTPATIENT
Start: 2024-11-01

## 2024-10-11 RX ORDER — ONDANSETRON HYDROCHLORIDE 2 MG/ML
4 INJECTION, SOLUTION INTRAVENOUS ONCE
Status: CANCELLED | OUTPATIENT
Start: 2024-11-01 | End: 2024-11-01

## 2024-10-11 RX ORDER — FAMOTIDINE 10 MG/ML
20 INJECTION INTRAVENOUS ONCE AS NEEDED
OUTPATIENT
Start: 2024-11-01

## 2024-10-11 RX ORDER — EPINEPHRINE 0.3 MG/.3ML
0.3 INJECTION SUBCUTANEOUS EVERY 5 MIN PRN
OUTPATIENT
Start: 2024-11-01

## 2024-10-11 RX ORDER — NITROGLYCERIN 0.4 MG/1
0.4 TABLET SUBLINGUAL ONCE
OUTPATIENT
Start: 2024-11-01 | End: 2024-11-01

## 2024-10-11 RX ORDER — DIPHENHYDRAMINE HYDROCHLORIDE 50 MG/ML
50 INJECTION INTRAMUSCULAR; INTRAVENOUS AS NEEDED
OUTPATIENT
Start: 2024-11-01

## 2024-10-11 RX ORDER — ONDANSETRON HYDROCHLORIDE 2 MG/ML
4 INJECTION, SOLUTION INTRAVENOUS ONCE
Status: COMPLETED | OUTPATIENT
Start: 2024-10-11 | End: 2024-10-11

## 2024-10-11 RX ORDER — ALBUTEROL SULFATE 0.83 MG/ML
3 SOLUTION RESPIRATORY (INHALATION) AS NEEDED
OUTPATIENT
Start: 2024-11-01

## 2024-10-11 ASSESSMENT — ENCOUNTER SYMPTOMS
DEPRESSION: 1
LOSS OF SENSATION IN FEET: 1
OCCASIONAL FEELINGS OF UNSTEADINESS: 1

## 2024-10-11 ASSESSMENT — PAIN SCALES - GENERAL
PAINLEVEL_OUTOF10: 2
PAINLEVEL_OUTOF10: 10 - WORST POSSIBLE PAIN
PAINLEVEL: 10-WORST PAIN EVER

## 2024-10-11 ASSESSMENT — PAIN - FUNCTIONAL ASSESSMENT
PAIN_FUNCTIONAL_ASSESSMENT: 0-10
PAIN_FUNCTIONAL_ASSESSMENT: 0-10

## 2024-10-11 NOTE — PROGRESS NOTES
S: Patient here for 22 opioid sparing pain infusion. Patient reports 80% reduction in pain after last infusion that lasted 2 days.    Purpose of pain infusion meds explained along with potential side effects.  Patient verbalized understanding.    B: Pain Issues 10/10. Is Patient breast feeding:No    A: Patient currently has pain described on flow sheet documentation. Designated  is [place]. Patient last ate solid food 4 hours ago, and had liquid 1 hours ago.    R: Plan; Obtain IV access, do patient risk assessment, and start opioid sparing infusion as ordered. Monitoring for S/S of adverse reactions.

## 2024-10-11 NOTE — PATIENT INSTRUCTIONS
Today :We administered ketamine 30 mg-lidocaine 300 mg, propofol, and ondansetron.     For:   1. Fibromyalgia         Your next appointment is due in:  3 weeks        Please read the  Medication Guide that was given to you and reviewed during todays visit.     (Tell all doctors including dentists that you are taking this medication)     Go to the emergency room or call 911 if:  -You have signs of allergic reaction:   -Rash, hives, itching.   -Swollen, blistered, peeling skin.   -Swelling of face, lips, mouth, tongue or throat.   -Tightness of chest, trouble breathing, swallowing or talking     Call your doctor:  - If IV / injection site gets red, warm, swollen, itchy or leaks fluid or pus.     (Leave dressing on your IV site for at least 2 hours and keep area clean and dry  - If you get sick or have symptoms of infection or are not feeling well for any reason.    (Wash your hands often, stay away from people who are sick)  - If you have side effects from your medication that do not go away or are bothersome.     (Refer to the teaching your nurse gave you for side effects to call your doctor about)    - Common side effects may include:  stuffy nose, headache, feeling tired, muscle aches, upset stomach  - Before receiving any vaccines     - [facility] at   If:  - You get sick, are on antibiotics, have had a recent vaccine, have surgery or dental work and your doctor wants your visit rescheduled.  - You need to cancel and reschedule your visit for any reason. Call at least 2 days before your visit if you need to cancel.   - Your insurance changes before your next visit.    (We will need to get approval from your new insurance. This can take up to two weeks.)     [facility] is opened Monday thru Friday. We are closed on weekends and holidays.   Voice mail will take your call if the center is closed. If you leave a message please allow 24 hours for a call back during  weekdays. If you leave a message on a weekend/holiday, we will call you back the next business day.              [facility]      Pain Infusion Aftercare Instructions      1. It is normal to feel sedated, tired and low in energy after a pain infusion. DO NOT DRIVE, OPERATE ANY MACHINERY, OR MAKE ANY IMPORTANT DECISIONS FOR AT LEAST 24 HOURS AFTER THE INFUSION.     2. Call the pain center at [phone] with any problems, questions, or concerns.     3. Eat light after the infusion. If you feel queasy or sick to your stomach, laying down with your eyes closed may help. When you resume eating start with something mild like clear liquids, yogurt, applesauce, crackers, etc… Gradually advance to a regular diet.     4. Do not leave your house alone the evening of your pain infusion.     5. No alcohol or sedative medications, such as sleeping pills, for 24 hours after your pain infusion.     6. Resume all other prescribed medications unless directed otherwise by you physician.     7. If you have any medical emergencies, call 911 or go directly to the closest emergency room.

## 2024-10-14 ENCOUNTER — TELEMEDICINE (OUTPATIENT)
Dept: PAIN MEDICINE | Facility: CLINIC | Age: 44
End: 2024-10-14
Payer: COMMERCIAL

## 2024-10-14 VITALS — BODY MASS INDEX: 33.27 KG/M2 | WEIGHT: 207 LBS | HEIGHT: 66 IN

## 2024-10-14 DIAGNOSIS — M79.7 FIBROMYALGIA: Primary | ICD-10-CM

## 2024-10-14 RX ORDER — KETAMINE HCL IN NACL, ISO-OSM 100MG/10ML
SYRINGE (ML) INJECTION ONCE
OUTPATIENT
Start: 2024-11-05

## 2024-10-14 ASSESSMENT — ENCOUNTER SYMPTOMS
WEAKNESS: 0
AGITATION: 0
DIFFICULTY URINATING: 0
DEPRESSION: 0
BACK PAIN: 1
WHEEZING: 0
NECK PAIN: 1
CHILLS: 0
SHORTNESS OF BREATH: 1
CHEST TIGHTNESS: 0
MYALGIAS: 1
FEVER: 0
PALPITATIONS: 0
RECTAL PAIN: 0
DIARRHEA: 0
FATIGUE: 0
LOSS OF SENSATION IN FEET: 0
CONSTIPATION: 0
CONFUSION: 0
NAUSEA: 0
NUMBNESS: 1
OCCASIONAL FEELINGS OF UNSTEADINESS: 1

## 2024-10-14 ASSESSMENT — LIFESTYLE VARIABLES: TOTAL SCORE: 2

## 2024-10-14 ASSESSMENT — PAIN SCALES - GENERAL
PAINLEVEL: 10-WORST PAIN EVER
PAINLEVEL_OUTOF10: 10 - WORST POSSIBLE PAIN

## 2024-10-14 ASSESSMENT — PAIN DESCRIPTION - DESCRIPTORS: DESCRIPTORS: RADIATING;SHOOTING;SHARP

## 2024-10-14 ASSESSMENT — PAIN - FUNCTIONAL ASSESSMENT: PAIN_FUNCTIONAL_ASSESSMENT: 0-10

## 2024-10-14 NOTE — PROGRESS NOTES
Virtual or Telephone Consent    A telephone visit (audio only) between the patient (at the originating site) and the provider (at the distant site) was utilized to provide this telehealth service.   Verbal consent was requested and obtained from Iraida La on this date, 10/14/24 for a telehealth visit.     Chief Complain  Follow-up for widespread muscle and joint pain with cervical pain radiating to shoulders and lower back pain radiating to bilateral lower extremities.    History Of Present Illness  Iraida La is a 44 y.o. female here for cervical radiating to shoulders and lower back pain radiating to bilateral lower extremities. The patient rates the pain at 10 on a scale from 0-10.  Due to her increasing activity levels and working 3 to 4 days a week as a  the patient describes pain as sharp, radiating, throbbing, shooting.  The pain is worsened by bending forward, standing, prolonged sitting, walking, lifting, squatting, and ADLs and working  and is alleviated by medications nonsteroidal anti-inflammatory drugs and Tylenol, position change, sitting, lying down, and gabapentin, chiropractic manipulations, acupuncture, dry needling and IV infusion therapy .  Since the last visit the pain has stayed the same.    The patient denies any fever, chills, weight loss, weakness, bladder/ bowel incontinence, history of cancer, history of IV drug abuse, recent trauma.     Prior office visit:  4/23/2024  Iraida La is a 43 y.o. female recall past medical history of [facility] injury in 2017 was with Dr. Estrella and with Sandeep used to be on hydrocodone that is done and case closed, he has fibromyalgia, and lumbar pain and on disability. She is being treated with IV infusion therapy every 3 weeks which provides 60-70% relief in lower back pain radiating to bilateral lower extremities for 10 to 12 days depending on activity level.  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  Overall she is doing well and is pleased with the progress she is making.  She denies any new neurological constitutional symptoms.  She denies bowel or bladder incontinence or saddle paresthesia.  She reports the numbness and tingling comes and goes.  She occasionally uses cane to stabilize her gait and assist with ambulation.  She denies any recent falls.  She scheduled to start aqua therapy.  She continues with gabapentin, lidocaine patches, Tylenol, and Flexeril which provides significant relief in symptoms.  She denies medication side effects.  Provided refill of gabapentin and lidocaine patches.  Given the relief IV infusion therapy provides we will continue with IV infusion therapy every 3 weeks for fibromyalgia pain.  New orders signed.  All questions and concerns answered.  Plan to follow-up in 3 months or sooner if needed.   \  Portions of record reviewed for pertinent issues: active problem list, medication list, allergies, family history, social history, notes from last encounter, encounters, lab results, imaging and other available records.    I have personally reviewed the OARRS report for this patient. This report is scanned into the electronic medical record. I have considered the risks of abuse, dependence, addiction and diversion. It showed: Gabapentin from myself and Dr. Balderas  OPIOID RISK ASSESSMENT SCORE 2/26  Aberrant behavior: None    Past Medical History  She has no past medical history on file.    Surgical History  She has no past surgical history on file.     Social History  She reports that she has been smoking cigarettes. She has been exposed to tobacco smoke. She has never used smokeless tobacco. She reports that she does not currently use alcohol after a past usage of about 1.0 standard drink of alcohol per week. She reports that she does not currently use drugs.    Family History  Family History   Problem Relation Name Age of  Onset    No Known Problems Mother      No Known Problems Father          Allergies  Aspirin, Erythromycin, Salicylates, Erythromycin base, Peanut, and Toradol [ketorolac]    Review of Systems  Review of Systems   Constitutional:  Negative for chills, fatigue and fever.   Respiratory:  Positive for shortness of breath. Negative for chest tightness and wheezing.         Current smoker half pack per day.  But long-hauler.   Cardiovascular:  Negative for chest pain and palpitations.   Gastrointestinal:  Negative for constipation, diarrhea, nausea and rectal pain.   Genitourinary:  Negative for difficulty urinating and urgency.   Musculoskeletal:  Positive for back pain, myalgias and neck pain.        Lower back pain radiates to neck and into the shoulders, symptoms worse after a long day of work.  Denies numbness and tingling upper extremities,   Neurological:  Positive for numbness. Negative for weakness.        History of diabetic peripheral neuropathy bilateral lower extremities.   Psychiatric/Behavioral:  Negative for agitation, confusion and suicidal ideas.         Physical Exam  Physical Exam  Constitutional:       General: She is not in acute distress.  Neurological:      General: No focal deficit present.      Mental Status: She is alert and oriented to person, place, and time.   Psychiatric:         Mood and Affect: Mood normal.         Speech: Speech normal.         Behavior: Behavior normal.         Thought Content: Thought content normal.         Judgment: Judgment normal.           Last Recorded Vitals  Virtual visit    Reviewed Images  7/24/2024 bilateral shoulder x-ray  Normal glenohumeral joint, mild osteoarthritis of the AC joint.  No other significant abnormality    Reviewed Labs  Lab Results   Component Value Date    GLUCOSE 82 10/11/2022    CALCIUM 8.5 (L) 10/11/2022     10/11/2022    K 3.4 (L) 10/11/2022    CO2 24 10/11/2022     10/11/2022    BUN 10 10/11/2022    CREATININE 0.64  10/11/2022         Assessment/Plan     Iraida La is a 44 y.o. female recall past medical history of [facility] injury in 2017 with Dr. Estrella and with Dr. Hopkins used to be on hydrocodone that case is closed, she has fibromyalgia, and lumbar pain, diabetic peripheral neuropathy and on disability. She is being treated with IV infusion therapy every 3 weeks which provides 100% relief in fibromyalgia and peripheral neuropathy for roughly 3 days and then symptoms returned to baseline by 7 to 10 days.  Her symptoms are aggravated by increasing activity and working as a  for 3 to 4 days a week.  She reports she is in 10 out of 10 pain by the end of the day.  She uses gabapentin 300 mg 3 times a day, Flexeril, lidocaine patches and topiramate to manage her pain between infusions.  This provides moderate relief in symptoms however the end of the day she is in more pain.  She is awaiting to schedule physical therapy, she is in acupuncture and chiropractic manipulations with dry needling and TENS unit once a week which provides moderate relief of symptoms.  She denies medication side effects.  She denies any new neurological constitutional symptoms.  She denies bowel or bladder incontinence or saddle paresthesia.  She reports her A1c is improving and she continues to lose weight.  She is currently lost 50 pounds.  Review of bilateral shoulder x-ray shows mild AC joint osteoarthritis, recommended trying to increase her gabapentin to 600 mg at night for neuropathic pain relief.  Continue with current pain management regiment.  Plan to continue with IV infusion therapy every 3 weeks with the goal of transitioning to every 4 weeks.  Encouraged to schedule physical therapy to improve flexibility range of motion.  Continue with chiropractic manipulations with dry needling and acupuncture.  Encouraged to continue with healthy dietary intake with a calorie deficit diet and reducing her A1c and weight loss.  Patient  verbalized understand plan of care all question concerns answered.      Plan  At least 50% of the visit was involved in the discussion of the options for treatment. We discussed exercises, medication, interventional therapies and surgery. Healthy life style is essential with patient hard work to achieve the wellness. In addition; discussion with the patient and/or family about any of the diagnostic results, impressions and/or recommended diagnostic studies, prognosis, risks and benefits of treatment options, instructions for treatment and/or follow-up, importance of compliance with chosen treatment options, risk-factor reduction, and patient/family education.     Patient is being treated with opioid therapy for pain and is responding appropriately.  There are NO signs of opioid intoxication, abuse, addiction or withdrawal.  Pupils are equal, reactive to light bilateral, appropriate speech and cognition. Patient denies any opioid induced constipation. The OARRS registry followed periodically, urine toxicology completed and appropriate.     Patient is advised and warned  in specific detail about potential benefits of opioids along with risks and side effects including, but not limited to, dependency, addiction, tolerance, hyperalgesia, anxiety, depression, insomnia, endocrine changes, immunologic disturbances, respiratory depression and death.     Caution advised regarding the use of medications prescribed at this office and specific mention made regarding not to drive or operate heavy machinery if feeling side effects from this the medications. Patient  expressed an understanding in regards to these particular concerns.     Discussed non-opioid options including (But no limited), physical wellness, antiinflammatory diet, icing and heating, meditation, relaxation, massage and acupuncture.    We will continue to monitor and adjust medications as needed.      *Please note this report has been produced using speech  recognition software and may contain errors related to that system including grammar, punctuation and spelling as well as words and phrases that may be inappropriate. If there are questions or concerns, please feel free to contact me to clarify.      I spent 50 minutes in the professional and overall care of this patient.       Jefferson Mckee, APRN-CNP

## 2024-11-05 ENCOUNTER — APPOINTMENT (OUTPATIENT)
Dept: INFUSION THERAPY | Facility: CLINIC | Age: 44
End: 2024-11-05
Payer: COMMERCIAL

## 2024-11-22 DIAGNOSIS — M79.7 FIBROMYALGIA: Primary | ICD-10-CM

## 2024-11-22 RX ORDER — KETAMINE HCL IN NACL, ISO-OSM 100MG/10ML
SYRINGE (ML) INJECTION ONCE
OUTPATIENT
Start: 2024-11-26

## 2024-11-22 RX ORDER — ONDANSETRON HYDROCHLORIDE 2 MG/ML
4 INJECTION, SOLUTION INTRAVENOUS ONCE
OUTPATIENT
Start: 2024-11-26 | End: 2024-11-26

## 2024-11-26 ENCOUNTER — APPOINTMENT (OUTPATIENT)
Dept: INFUSION THERAPY | Facility: CLINIC | Age: 44
End: 2024-11-26
Payer: COMMERCIAL

## 2025-01-26 DIAGNOSIS — M79.7 FIBROMYALGIA: ICD-10-CM

## 2025-01-26 DIAGNOSIS — M54.41 CHRONIC BILATERAL LOW BACK PAIN WITH BILATERAL SCIATICA: ICD-10-CM

## 2025-01-26 DIAGNOSIS — G89.29 CHRONIC BILATERAL LOW BACK PAIN WITH BILATERAL SCIATICA: ICD-10-CM

## 2025-01-26 DIAGNOSIS — M54.42 CHRONIC BILATERAL LOW BACK PAIN WITH BILATERAL SCIATICA: ICD-10-CM

## 2025-01-28 RX ORDER — GABAPENTIN 300 MG/1
300 CAPSULE ORAL 3 TIMES DAILY
Qty: 90 CAPSULE | Refills: 0 | Status: SHIPPED | OUTPATIENT
Start: 2025-02-05

## 2025-02-25 DIAGNOSIS — M79.7 FIBROMYALGIA: Primary | ICD-10-CM

## 2025-02-25 RX ORDER — KETAMINE HCL IN NACL, ISO-OSM 100MG/10ML
SYRINGE (ML) INJECTION ONCE
Status: CANCELLED | OUTPATIENT
Start: 2025-02-27

## 2025-02-27 ENCOUNTER — INFUSION (OUTPATIENT)
Dept: INFUSION THERAPY | Facility: CLINIC | Age: 45
End: 2025-02-27
Payer: COMMERCIAL

## 2025-02-27 VITALS
HEART RATE: 85 BPM | SYSTOLIC BLOOD PRESSURE: 120 MMHG | DIASTOLIC BLOOD PRESSURE: 74 MMHG | OXYGEN SATURATION: 99 % | RESPIRATION RATE: 16 BRPM | TEMPERATURE: 98.1 F

## 2025-02-27 DIAGNOSIS — M79.7 FIBROMYALGIA: ICD-10-CM

## 2025-02-27 PROCEDURE — 2500000004 HC RX 250 GENERAL PHARMACY W/ HCPCS (ALT 636 FOR OP/ED): Performed by: NURSE PRACTITIONER

## 2025-02-27 PROCEDURE — 2500000004 HC RX 250 GENERAL PHARMACY W/ HCPCS (ALT 636 FOR OP/ED): Performed by: ANESTHESIOLOGY

## 2025-02-27 PROCEDURE — 96365 THER/PROPH/DIAG IV INF INIT: CPT | Mod: INF

## 2025-02-27 PROCEDURE — 96375 TX/PRO/DX INJ NEW DRUG ADDON: CPT | Mod: INF

## 2025-02-27 PROCEDURE — 96368 THER/DIAG CONCURRENT INF: CPT | Mod: INF

## 2025-02-27 RX ORDER — DIPHENHYDRAMINE HYDROCHLORIDE 50 MG/ML
50 INJECTION INTRAMUSCULAR; INTRAVENOUS AS NEEDED
OUTPATIENT
Start: 2025-03-20

## 2025-02-27 RX ORDER — NITROGLYCERIN 0.4 MG/1
0.4 TABLET SUBLINGUAL ONCE
OUTPATIENT
Start: 2025-03-20 | End: 2025-03-20

## 2025-02-27 RX ORDER — ONDANSETRON HYDROCHLORIDE 2 MG/ML
4 INJECTION, SOLUTION INTRAVENOUS ONCE
OUTPATIENT
Start: 2025-03-20 | End: 2025-03-20

## 2025-02-27 RX ORDER — ONDANSETRON HYDROCHLORIDE 2 MG/ML
4 INJECTION, SOLUTION INTRAVENOUS ONCE
Status: COMPLETED | OUTPATIENT
Start: 2025-02-27 | End: 2025-02-27

## 2025-02-27 RX ORDER — DIPHENHYDRAMINE HYDROCHLORIDE 50 MG/ML
25 INJECTION INTRAMUSCULAR; INTRAVENOUS ONCE
OUTPATIENT
Start: 2025-03-20 | End: 2025-03-20

## 2025-02-27 RX ORDER — METOCLOPRAMIDE HYDROCHLORIDE 5 MG/ML
10 INJECTION INTRAMUSCULAR; INTRAVENOUS ONCE
OUTPATIENT
Start: 2025-03-20 | End: 2025-03-20

## 2025-02-27 RX ORDER — EPINEPHRINE 0.3 MG/.3ML
0.3 INJECTION SUBCUTANEOUS EVERY 5 MIN PRN
OUTPATIENT
Start: 2025-03-20

## 2025-02-27 RX ORDER — FAMOTIDINE 10 MG/ML
20 INJECTION, SOLUTION INTRAVENOUS ONCE AS NEEDED
OUTPATIENT
Start: 2025-03-20

## 2025-02-27 RX ORDER — KETAMINE HCL IN NACL, ISO-OSM 100MG/10ML
SYRINGE (ML) INJECTION ONCE
OUTPATIENT
Start: 2025-03-20

## 2025-02-27 RX ORDER — METOPROLOL TARTRATE 25 MG/1
25 TABLET, FILM COATED ORAL ONCE
OUTPATIENT
Start: 2025-03-20 | End: 2025-03-20

## 2025-02-27 RX ORDER — KETAMINE HCL IN NACL, ISO-OSM 100MG/10ML
SYRINGE (ML) INJECTION ONCE
Status: COMPLETED | OUTPATIENT
Start: 2025-02-27 | End: 2025-02-27

## 2025-02-27 RX ORDER — ALBUTEROL SULFATE 0.83 MG/ML
3 SOLUTION RESPIRATORY (INHALATION) AS NEEDED
OUTPATIENT
Start: 2025-03-20

## 2025-02-27 RX ADMIN — ONDANSETRON 4 MG: 2 INJECTION INTRAMUSCULAR; INTRAVENOUS at 08:49

## 2025-02-27 RX ADMIN — Medication: at 08:48

## 2025-02-27 RX ADMIN — PROPOFOL 100 MG: 10 INJECTION, EMULSION INTRAVENOUS at 08:48

## 2025-02-27 ASSESSMENT — LIFESTYLE VARIABLES
AUDIT-C TOTAL SCORE: 3
HOW MANY STANDARD DRINKS CONTAINING ALCOHOL DO YOU HAVE ON A TYPICAL DAY: 3 OR 4
HOW OFTEN DO YOU HAVE SIX OR MORE DRINKS ON ONE OCCASION: LESS THAN MONTHLY
SKIP TO QUESTIONS 9-10: 0
HOW OFTEN DO YOU HAVE A DRINK CONTAINING ALCOHOL: MONTHLY OR LESS

## 2025-02-27 ASSESSMENT — PATIENT HEALTH QUESTIONNAIRE - PHQ9
4. FEELING TIRED OR HAVING LITTLE ENERGY: NEARLY EVERY DAY
2. FEELING DOWN, DEPRESSED OR HOPELESS: NEARLY EVERY DAY
1. LITTLE INTEREST OR PLEASURE IN DOING THINGS: NEARLY EVERY DAY
7. TROUBLE CONCENTRATING ON THINGS, SUCH AS READING THE NEWSPAPER OR WATCHING TELEVISION: NEARLY EVERY DAY
SUM OF ALL RESPONSES TO PHQ QUESTIONS 1-9: 23
6. FEELING BAD ABOUT YOURSELF - OR THAT YOU ARE A FAILURE OR HAVE LET YOURSELF OR YOUR FAMILY DOWN: NEARLY EVERY DAY
3. TROUBLE FALLING OR STAYING ASLEEP OR SLEEPING TOO MUCH: NEARLY EVERY DAY
9. THOUGHTS THAT YOU WOULD BE BETTER OFF DEAD, OR OF HURTING YOURSELF: NOT AT ALL
10. IF YOU CHECKED OFF ANY PROBLEMS, HOW DIFFICULT HAVE THESE PROBLEMS MADE IT FOR YOU TO DO YOUR WORK, TAKE CARE OF THINGS AT HOME, OR GET ALONG WITH OTHER PEOPLE: VERY DIFFICULT
8. MOVING OR SPEAKING SO SLOWLY THAT OTHER PEOPLE COULD HAVE NOTICED. OR THE OPPOSITE, BEING SO FIGETY OR RESTLESS THAT YOU HAVE BEEN MOVING AROUND A LOT MORE THAN USUAL: MORE THAN HALF THE DAYS
SUM OF ALL RESPONSES TO PHQ9 QUESTIONS 1 AND 2: 6
5. POOR APPETITE OR OVEREATING: NEARLY EVERY DAY

## 2025-02-27 ASSESSMENT — COLUMBIA-SUICIDE SEVERITY RATING SCALE - C-SSRS
6. HAVE YOU EVER DONE ANYTHING, STARTED TO DO ANYTHING, OR PREPARED TO DO ANYTHING TO END YOUR LIFE?: NO
1. IN THE PAST MONTH, HAVE YOU WISHED YOU WERE DEAD OR WISHED YOU COULD GO TO SLEEP AND NOT WAKE UP?: YES
6. HAVE YOU EVER DONE ANYTHING, STARTED TO DO ANYTHING, OR PREPARED TO DO ANYTHING TO END YOUR LIFE?: NO
2. HAVE YOU ACTUALLY HAD ANY THOUGHTS OF KILLING YOURSELF?: NO

## 2025-02-27 ASSESSMENT — ENCOUNTER SYMPTOMS
DEPRESSION: 1
LOSS OF SENSATION IN FEET: 1
OCCASIONAL FEELINGS OF UNSTEADINESS: 1

## 2025-02-27 ASSESSMENT — PAIN SCALES - GENERAL: PAINLEVEL_OUTOF10: 0 - NO PAIN

## 2025-02-27 NOTE — PROGRESS NOTES
S: Patient here for 22nd opioid sparing pain infusion. Patient reports 80% reduction in pain after last infusion that lasted 2 days.    Purpose of pain infusion meds explained along with potential side effects.  Patient verbalized understanding.    B: Pain Issues in lower back, right arm and shoulder   Is Patient breast feeding: no    A: Patient currently has pain described on flow sheet documentation. Designated  is [employer] at [phone]. Patient last ate solid food >4 hours ago, and had liquid 1 hours ago.    R: Plan; Obtain IV access, do patient risk assessment, and start opioid sparing infusion as ordered. Monitoring for S/S of adverse reactions.    0931- Post infusion teaching provided. Patient verbalized understanding. VSS, Patient states pain is 0. Will assist patient to waiting car via wheelchair.

## 2025-02-27 NOTE — PATIENT INSTRUCTIONS
Today :We administered ondansetron, ketamine 30 mg-lidocaine 300 mg, and propofol (Diprivan).     For:   1. Fibromyalgia          (Tell all doctors including dentists that you are taking this medication)     Go to the emergency room or call 911 if:  -You have signs of allergic reaction:   -Rash, hives, itching.   -Swollen, blistered, peeling skin.   -Swelling of face, lips, mouth, tongue or throat.   -Tightness of chest, trouble breathing, swallowing or talking     Call your doctor:  - If IV / injection site gets red, warm, swollen, itchy or leaks fluid or pus.     (Leave dressing on your IV site for at least 2 hours and keep area clean and dry  - If you get sick or have symptoms of infection or are not feeling well for any reason.    (Wash your hands often, stay away from people who are sick)  - If you have side effects from your medication that do not go away or are bothersome.     (Refer to the teaching your nurse gave you for side effects to call your doctor about)    - Common side effects may include:  stuffy nose, headache, feeling tired, muscle aches, upset stomach  - Before receiving any vaccines     - Call [facility] at   If:  - You get sick, are on antibiotics, have had a recent vaccine, have surgery or dental work and your doctor wants your visit rescheduled.  - You need to cancel and reschedule your visit for any reason. Call at least 2 days before your visit if you need to cancel.   - Your insurance changes before your next visit.    (We will need to get approval from your new insurance. This can take up to two weeks.)     [facility] is opened Monday thru Friday. We are closed on weekends and holidays.   Voice mail will take your call if the center is closed. If you leave a message please allow 24 hours for a call back during weekdays. If you leave a message on a weekend/holiday, we will call you back the next business day.    A pharmacist is available Monday - Friday from  8:30AM to 3:30PM to help answer any questions you may have about your prescriptions(s). Please call pharmacy at:    [facility]: [phone]  [facility]: [phone]  [facility]: [phone]              [facility]      Pain Infusion Aftercare Instructions      1. It is normal to feel sedated, tired and low in energy after a pain infusion. DO NOT DRIVE, OPERATE ANY MACHINERY, OR MAKE ANY IMPORTANT DECISIONS FOR AT LEAST 24 HOURS AFTER THE INFUSION.     2. Call the pain center at [phone] with any problems, questions, or concerns.     3. Eat light after the infusion. If you feel queasy or sick to your stomach, laying down with your eyes closed may help. When you resume eating start with something mild like clear liquids, yogurt, applesauce, crackers, etc… Gradually advance to a regular diet.     4. Do not leave your house alone the evening of your pain infusion.     5. No alcohol or sedative medications, such as sleeping pills, for 24 hours after your pain infusion.     6. Resume all other prescribed medications unless directed otherwise by you physician.     7. If you have any medical emergencies, call 911 or go directly to the closest emergency room.    Patient Instructions  IV Infusion  [facility]  1. A responsible adult must stay with you during your infusion and drive you home. If you do not have a responsible adult with transportation home, your appointment will be rescheduled. Patients must still have a responsible adult if they use Rides[employer]are, Uber, or Lyft. Uber, Rides[employer], or Lyft drivers do not qualify.   2. On the day of your infusion we ask that you please drink clear liquids until your appointment.  You should NOT eat food 4 hours before your infusion.    3. Take all medications as prescribed before your infusion. Do not withhold blood pressure medication.   4. Patients who use a CPAP or BIPAP should bring it to the  infusion appointment.   5. Children under 16 will not be permitted in the infusion clinic. Please do not bring young children or pets. For patients who must bring a service animal, paperwork will be required. NO EXCEPTIONS.  6.  All Women will be required to take a pregnancy test prior to the infusion unless they are above 55 years of age or have had a hysterectomy.   7. Patients who cancel their infusion should call the Infusion line at [phone] as soon as possible. We would appreciate a 48-hour notice. Please be on time for the appt. Patients who are more than 15 mins late will have to cancel and reschedule. Infusion appt times are minimal. Patients who do not show, cancel, or reschedule an appointment may have a long wait until we can get them back on the schedule.   8. We make every effort to schedule your infusions based on your physician's recommendations. However, factors will affect our infusion schedule and availability. We appreciate your understanding.  9. Appointments will only be scheduled for two appointments in the future.   10. No eating, drinking, or chewing gum during the infusion.   11. If you miss or cancel your infusion appointment it is YOUR responsibility to call us and reschedule.  Please call [phone] or [phone] to reschedule or cancel appointment

## 2025-03-06 ENCOUNTER — TELEMEDICINE (OUTPATIENT)
Dept: PAIN MEDICINE | Facility: CLINIC | Age: 45
End: 2025-03-06
Payer: COMMERCIAL

## 2025-03-06 DIAGNOSIS — M79.7 FIBROMYALGIA: Primary | ICD-10-CM

## 2025-03-06 PROCEDURE — 99213 OFFICE O/P EST LOW 20 MIN: CPT | Performed by: NURSE PRACTITIONER

## 2025-03-06 RX ORDER — KETAMINE HCL IN NACL, ISO-OSM 100MG/10ML
SYRINGE (ML) INJECTION ONCE
OUTPATIENT
Start: 2025-03-26

## 2025-03-06 ASSESSMENT — ENCOUNTER SYMPTOMS
HEADACHES: 1
CONFUSION: 0
CHEST TIGHTNESS: 0
AGITATION: 0
MYALGIAS: 1
CHILLS: 0
NAUSEA: 0
LOSS OF SENSATION IN FEET: 1
DIZZINESS: 0
NUMBNESS: 1
WHEEZING: 0
FREQUENCY: 0
BACK PAIN: 1
CONSTIPATION: 0
FEVER: 0
DEPRESSION: 0
DIARRHEA: 0
OCCASIONAL FEELINGS OF UNSTEADINESS: 0
SHORTNESS OF BREATH: 0

## 2025-03-06 ASSESSMENT — PAIN DESCRIPTION - DESCRIPTORS: DESCRIPTORS: NUMBNESS;SHARP;SHOOTING

## 2025-03-06 ASSESSMENT — PAIN SCALES - GENERAL: PAINLEVEL_OUTOF10: 7

## 2025-03-06 ASSESSMENT — PAIN - FUNCTIONAL ASSESSMENT: PAIN_FUNCTIONAL_ASSESSMENT: 0-10
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
## 2025-03-26 ENCOUNTER — INFUSION (OUTPATIENT)
Dept: INFUSION THERAPY | Facility: CLINIC | Age: 45
End: 2025-03-26
Payer: COMMERCIAL

## 2025-03-26 VITALS
DIASTOLIC BLOOD PRESSURE: 81 MMHG | HEART RATE: 90 BPM | OXYGEN SATURATION: 100 % | SYSTOLIC BLOOD PRESSURE: 121 MMHG | RESPIRATION RATE: 17 BRPM

## 2025-03-26 DIAGNOSIS — M79.7 FIBROMYALGIA: ICD-10-CM

## 2025-03-26 PROCEDURE — 96375 TX/PRO/DX INJ NEW DRUG ADDON: CPT | Mod: INF

## 2025-03-26 PROCEDURE — 2500000004 HC RX 250 GENERAL PHARMACY W/ HCPCS (ALT 636 FOR OP/ED): Performed by: NURSE PRACTITIONER

## 2025-03-26 PROCEDURE — 96368 THER/DIAG CONCURRENT INF: CPT | Mod: INF

## 2025-03-26 PROCEDURE — 96365 THER/PROPH/DIAG IV INF INIT: CPT | Mod: INF

## 2025-03-26 RX ORDER — ALBUTEROL SULFATE 0.83 MG/ML
3 SOLUTION RESPIRATORY (INHALATION) AS NEEDED
OUTPATIENT
Start: 2025-04-16

## 2025-03-26 RX ORDER — KETAMINE HCL IN NACL, ISO-OSM 100MG/10ML
SYRINGE (ML) INJECTION ONCE
Status: COMPLETED | OUTPATIENT
Start: 2025-03-26 | End: 2025-03-26

## 2025-03-26 RX ORDER — KETAMINE HCL IN NACL, ISO-OSM 100MG/10ML
SYRINGE (ML) INJECTION ONCE
OUTPATIENT
Start: 2025-04-16

## 2025-03-26 RX ORDER — EPINEPHRINE 0.3 MG/.3ML
0.3 INJECTION SUBCUTANEOUS EVERY 5 MIN PRN
OUTPATIENT
Start: 2025-04-16

## 2025-03-26 RX ORDER — METOCLOPRAMIDE HYDROCHLORIDE 5 MG/ML
10 INJECTION INTRAMUSCULAR; INTRAVENOUS ONCE
OUTPATIENT
Start: 2025-04-16 | End: 2025-04-16

## 2025-03-26 RX ORDER — METOPROLOL TARTRATE 25 MG/1
25 TABLET, FILM COATED ORAL ONCE
OUTPATIENT
Start: 2025-04-16 | End: 2025-04-16

## 2025-03-26 RX ORDER — DIPHENHYDRAMINE HYDROCHLORIDE 50 MG/ML
50 INJECTION, SOLUTION INTRAMUSCULAR; INTRAVENOUS AS NEEDED
OUTPATIENT
Start: 2025-04-16

## 2025-03-26 RX ORDER — ONDANSETRON HYDROCHLORIDE 2 MG/ML
4 INJECTION, SOLUTION INTRAVENOUS ONCE
Status: COMPLETED | OUTPATIENT
Start: 2025-03-26 | End: 2025-03-26

## 2025-03-26 RX ORDER — DIPHENHYDRAMINE HYDROCHLORIDE 50 MG/ML
25 INJECTION, SOLUTION INTRAMUSCULAR; INTRAVENOUS ONCE
OUTPATIENT
Start: 2025-04-16 | End: 2025-04-16

## 2025-03-26 RX ORDER — NITROGLYCERIN 0.4 MG/1
0.4 TABLET SUBLINGUAL ONCE
OUTPATIENT
Start: 2025-04-16 | End: 2025-04-16

## 2025-03-26 RX ORDER — ONDANSETRON HYDROCHLORIDE 2 MG/ML
4 INJECTION, SOLUTION INTRAVENOUS ONCE
OUTPATIENT
Start: 2025-04-16 | End: 2025-04-16

## 2025-03-26 RX ORDER — FAMOTIDINE 10 MG/ML
20 INJECTION, SOLUTION INTRAVENOUS ONCE AS NEEDED
OUTPATIENT
Start: 2025-04-16

## 2025-03-26 RX ADMIN — ONDANSETRON 4 MG: 2 INJECTION INTRAMUSCULAR; INTRAVENOUS at 15:41

## 2025-03-26 RX ADMIN — Medication: at 15:40

## 2025-03-26 RX ADMIN — PROPOFOL 100 MG: 10 INJECTION, EMULSION INTRAVENOUS at 15:41

## 2025-03-26 ASSESSMENT — PATIENT HEALTH QUESTIONNAIRE - PHQ9
1. LITTLE INTEREST OR PLEASURE IN DOING THINGS: NOT AT ALL
SUM OF ALL RESPONSES TO PHQ9 QUESTIONS 1 AND 2: 0
2. FEELING DOWN, DEPRESSED OR HOPELESS: NOT AT ALL

## 2025-03-26 ASSESSMENT — ENCOUNTER SYMPTOMS
DEPRESSION: 0
OCCASIONAL FEELINGS OF UNSTEADINESS: 0
LOSS OF SENSATION IN FEET: 0

## 2025-03-26 ASSESSMENT — COLUMBIA-SUICIDE SEVERITY RATING SCALE - C-SSRS
2. HAVE YOU ACTUALLY HAD ANY THOUGHTS OF KILLING YOURSELF?: NO
6. HAVE YOU EVER DONE ANYTHING, STARTED TO DO ANYTHING, OR PREPARED TO DO ANYTHING TO END YOUR LIFE?: NO
6. HAVE YOU EVER DONE ANYTHING, STARTED TO DO ANYTHING, OR PREPARED TO DO ANYTHING TO END YOUR LIFE?: NO
1. IN THE PAST MONTH, HAVE YOU WISHED YOU WERE DEAD OR WISHED YOU COULD GO TO SLEEP AND NOT WAKE UP?: NO

## 2025-03-26 ASSESSMENT — PAIN SCALES - GENERAL
PAINLEVEL_OUTOF10: 0 - NO PAIN
PAINLEVEL_OUTOF10: 8
PAINLEVEL_OUTOF10: 8

## 2025-03-26 ASSESSMENT — PAIN - FUNCTIONAL ASSESSMENT: PAIN_FUNCTIONAL_ASSESSMENT: 0-10

## 2025-03-26 NOTE — PROGRESS NOTES
S: Patient here for 24th opioid sparing pain infusion. Patient reports 90% reduction in pain after last infusion that lasted 3 days.    Purpose of pain infusion meds explained along with potential side effects.  Patient verbalized understanding.    B: Pain Issues: Lower back, upper right shoulder and light headache.    Patient breast feeding: No    A: Patient currently has pain described on flow sheet documentation. Designated  is Eliseo Cowart @[phone]. Patient last ate solid food 4 hours ago, and had liquid 1 hour ago.    R: Plan; Obtain IV access, do patient risk assessment, and start opioid sparing infusion as ordered. Monitoring for S/S of adverse reactions.    1625-Post infusion teaching provided. Patient verbalized understanding. VSS, Patient states pain is 0/10. Will assist patient to waiting car via wheelchair.

## 2025-03-26 NOTE — PATIENT INSTRUCTIONS
Today :We administered ondansetron, ketamine 30 mg-lidocaine 300 mg, and propofol (Diprivan).     For:   1. Fibromyalgia          (Tell all doctors including dentists that you are taking this medication)     Go to the emergency room or call 911 if:  -You have signs of allergic reaction:   -Rash, hives, itching.   -Swollen, blistered, peeling skin.   -Swelling of face, lips, mouth, tongue or throat.   -Tightness of chest, trouble breathing, swallowing or talking     Call your doctor:  - If IV / injection site gets red, warm, swollen, itchy or leaks fluid or pus.     (Leave dressing on your IV site for at least 2 hours and keep area clean and dry  - If you get sick or have symptoms of infection or are not feeling well for any reason.    (Wash your hands often, stay away from people who are sick)  - If you have side effects from your medication that do not go away or are bothersome.     (Refer to the teaching your nurse gave you for side effects to call your doctor about)    - Common side effects may include:  stuffy nose, headache, feeling tired, muscle aches, upset stomach  - Before receiving any vaccines     - Call [facility] at   If:  - You get sick, are on antibiotics, have had a recent vaccine, have surgery or dental work and your doctor wants your visit rescheduled.  - You need to cancel and reschedule your visit for any reason. Call at least 2 days before your visit if you need to cancel.   - Your insurance changes before your next visit.    (We will need to get approval from your new insurance. This can take up to two weeks.)     [facility] is opened Monday thru Friday. We are closed on weekends and holidays.   Voice mail will take your call if the center is closed. If you leave a message please allow 24 hours for a call back during weekdays. If you leave a message on a weekend/holiday, we will call you back the next business day.    A pharmacist is available Monday - Friday from  8:30AM to 3:30PM to help answer any questions you may have about your prescriptions(s). Please call pharmacy at:    [facility]: [phone]  [facility]: [phone]  [facility]: [phone]              [facility]      Pain Infusion Aftercare Instructions      1. It is normal to feel sedated, tired and low in energy after a pain infusion. DO NOT DRIVE, OPERATE ANY MACHINERY, OR MAKE ANY IMPORTANT DECISIONS FOR AT LEAST 24 HOURS AFTER THE INFUSION.     2. Call the pain center at [phone] with any problems, questions, or concerns.     3. Eat light after the infusion. If you feel queasy or sick to your stomach, laying down with your eyes closed may help. When you resume eating start with something mild like clear liquids, yogurt, applesauce, crackers, etc… Gradually advance to a regular diet.     4. Do not leave your house alone the evening of your pain infusion.     5. No alcohol or sedative medications, such as sleeping pills, for 24 hours after your pain infusion.     6. Resume all other prescribed medications unless directed otherwise by you physician.     7. If you have any medical emergencies, call 911 or go directly to the closest emergency room.  Patient Instructions  IV Infusion  [facility]  1. A responsible adult must stay with you during your infusion and drive you home. If you do not have a responsible adult with transportation home, your appointment will be rescheduled. Patients must still have a responsible adult if they use Rideshare, Uber, or Lyft. Uber, Rideshare, or Lyft drivers do not qualify.   2. On the day of your infusion we ask that you please drink clear liquids until your appointment.  You should NOT eat food 4 hours before your infusion.    3. Take all medications as prescribed before your infusion. Do not withhold blood pressure medication.   4. Patients who use a CPAP or BIPAP should bring it to the  infusion appointment.   5. Children under 16 will not be permitted in the infusion clinic. Please do not bring young children or pets. For patients who must bring a service animal, paperwork will be required. NO EXCEPTIONS.  6.  All Women will be required to take a pregnancy test prior to the infusion unless they are above 55 years of age or have had a hysterectomy.   7. Patients who cancel their infusion should call the Infusion line at [phone] as soon as possible. We would appreciate a 48-hour notice. Please be on time for the appt. Patients who are more than 15 mins late will have to cancel and reschedule. Infusion appt times are minimal. Patients who do not show, cancel, or reschedule an appointment may have a long wait until we can get them back on the schedule.   8. We make every effort to schedule your infusions based on your physician's recommendations. However, factors will affect our infusion schedule and availability. We appreciate your understanding.  9. Appointments will only be scheduled for two appointments in the future.   10. No eating, drinking, or chewing gum during the infusion.   11. If you miss or cancel your infusion appointment it is YOUR responsibility to call us and reschedule.  Please call [phone] or [phone] to reschedule or cancel appointment

## 2025-04-14 DIAGNOSIS — M79.7 FIBROMYALGIA: Primary | ICD-10-CM

## 2025-04-15 DIAGNOSIS — M79.7 FIBROMYALGIA: Primary | ICD-10-CM

## 2025-04-15 RX ORDER — KETAMINE HCL IN NACL, ISO-OSM 100MG/10ML
SYRINGE (ML) INJECTION ONCE
Status: CANCELLED | OUTPATIENT
Start: 2025-04-17

## 2025-04-17 ENCOUNTER — INFUSION (OUTPATIENT)
Dept: INFUSION THERAPY | Facility: CLINIC | Age: 45
End: 2025-04-17
Payer: COMMERCIAL

## 2025-04-17 VITALS
HEART RATE: 82 BPM | TEMPERATURE: 98.1 F | RESPIRATION RATE: 14 BRPM | DIASTOLIC BLOOD PRESSURE: 80 MMHG | SYSTOLIC BLOOD PRESSURE: 115 MMHG | OXYGEN SATURATION: 98 %

## 2025-04-17 DIAGNOSIS — M79.7 FIBROMYALGIA: ICD-10-CM

## 2025-04-17 PROCEDURE — 96375 TX/PRO/DX INJ NEW DRUG ADDON: CPT | Mod: INF

## 2025-04-17 PROCEDURE — 96365 THER/PROPH/DIAG IV INF INIT: CPT | Mod: INF

## 2025-04-17 PROCEDURE — 96368 THER/DIAG CONCURRENT INF: CPT | Mod: INF

## 2025-04-17 PROCEDURE — 2500000004 HC RX 250 GENERAL PHARMACY W/ HCPCS (ALT 636 FOR OP/ED): Performed by: NURSE PRACTITIONER

## 2025-04-17 RX ORDER — KETAMINE HCL IN NACL, ISO-OSM 100MG/10ML
SYRINGE (ML) INJECTION ONCE
Status: COMPLETED | OUTPATIENT
Start: 2025-04-17 | End: 2025-04-17

## 2025-04-17 RX ORDER — METOCLOPRAMIDE HYDROCHLORIDE 5 MG/ML
10 INJECTION INTRAMUSCULAR; INTRAVENOUS ONCE
OUTPATIENT
Start: 2025-05-08 | End: 2025-05-08

## 2025-04-17 RX ORDER — NITROGLYCERIN 0.4 MG/1
0.4 TABLET SUBLINGUAL ONCE
OUTPATIENT
Start: 2025-05-08 | End: 2025-05-08

## 2025-04-17 RX ORDER — EPINEPHRINE 0.3 MG/.3ML
0.3 INJECTION SUBCUTANEOUS EVERY 5 MIN PRN
OUTPATIENT
Start: 2025-05-08

## 2025-04-17 RX ORDER — DIPHENHYDRAMINE HYDROCHLORIDE 50 MG/ML
50 INJECTION, SOLUTION INTRAMUSCULAR; INTRAVENOUS AS NEEDED
OUTPATIENT
Start: 2025-05-08

## 2025-04-17 RX ORDER — METOPROLOL TARTRATE 25 MG/1
25 TABLET, FILM COATED ORAL ONCE
OUTPATIENT
Start: 2025-05-08 | End: 2025-05-08

## 2025-04-17 RX ORDER — ONDANSETRON HYDROCHLORIDE 2 MG/ML
4 INJECTION, SOLUTION INTRAVENOUS ONCE
Status: COMPLETED | OUTPATIENT
Start: 2025-04-17 | End: 2025-04-17

## 2025-04-17 RX ORDER — ALBUTEROL SULFATE 0.83 MG/ML
3 SOLUTION RESPIRATORY (INHALATION) AS NEEDED
OUTPATIENT
Start: 2025-05-08

## 2025-04-17 RX ORDER — ONDANSETRON HYDROCHLORIDE 2 MG/ML
4 INJECTION, SOLUTION INTRAVENOUS ONCE
OUTPATIENT
Start: 2025-05-08 | End: 2025-05-08

## 2025-04-17 RX ORDER — DIPHENHYDRAMINE HYDROCHLORIDE 50 MG/ML
25 INJECTION, SOLUTION INTRAMUSCULAR; INTRAVENOUS ONCE
OUTPATIENT
Start: 2025-05-08 | End: 2025-05-08

## 2025-04-17 RX ORDER — FAMOTIDINE 10 MG/ML
20 INJECTION, SOLUTION INTRAVENOUS ONCE AS NEEDED
OUTPATIENT
Start: 2025-05-08

## 2025-04-17 RX ADMIN — ONDANSETRON 4 MG: 2 INJECTION INTRAMUSCULAR; INTRAVENOUS at 15:21

## 2025-04-17 RX ADMIN — Medication: at 15:22

## 2025-04-17 RX ADMIN — PROPOFOL 100 MG: 10 INJECTION, EMULSION INTRAVENOUS at 15:22

## 2025-04-17 ASSESSMENT — PAIN SCALES - GENERAL
PAINLEVEL_OUTOF10: 8
PAINLEVEL_OUTOF10: 0 - NO PAIN

## 2025-04-17 ASSESSMENT — ENCOUNTER SYMPTOMS
LOSS OF SENSATION IN FEET: 1
DEPRESSION: 1
OCCASIONAL FEELINGS OF UNSTEADINESS: 1

## 2025-04-17 ASSESSMENT — PAIN - FUNCTIONAL ASSESSMENT
PAIN_FUNCTIONAL_ASSESSMENT: 0-10
PAIN_FUNCTIONAL_ASSESSMENT: 0-10

## 2025-04-17 ASSESSMENT — PAIN DESCRIPTION - DESCRIPTORS: DESCRIPTORS: ACHING

## 2025-04-17 NOTE — PATIENT INSTRUCTIONS
Today :We administered ondansetron, ketamine 30 mg-lidocaine 300 mg, and propofol (Diprivan).     For:   1. Fibromyalgia         Your next appointment is due in:  see AVS        Please read the  Medication Guide that was given to you and reviewed during todays visit.     (Tell all doctors including dentists that you are taking this medication)     Go to the emergency room or call 911 if:  -You have signs of allergic reaction:   -Rash, hives, itching.   -Swollen, blistered, peeling skin.   -Swelling of face, lips, mouth, tongue or throat.   -Tightness of chest, trouble breathing, swallowing or talking     Call your doctor:  - If IV / injection site gets red, warm, swollen, itchy or leaks fluid or pus.     (Leave dressing on your IV site for at least 2 hours and keep area clean and dry  - If you get sick or have symptoms of infection or are not feeling well for any reason.    (Wash your hands often, stay away from people who are sick)  - If you have side effects from your medication that do not go away or are bothersome.     (Refer to the teaching your nurse gave you for side effects to call your doctor about)    - Common side effects may include:  stuffy nose, headache, feeling tired, muscle aches, upset stomach  - Before receiving any vaccines     - Call [facility] at   If:  - You get sick, are on antibiotics, have had a recent vaccine, have surgery or dental work and your doctor wants your visit rescheduled.  - You need to cancel and reschedule your visit for any reason. Call at least 2 days before your visit if you need to cancel.   - Your insurance changes before your next visit.    (We will need to get approval from your new insurance. This can take up to two weeks.)     [facility] is opened Monday thru Friday. We are closed on weekends and holidays.   Voice mail will take your call if the center is closed. If you leave a message please allow 24 hours for a call back  during weekdays. If you leave a message on a weekend/holiday, we will call you back the next business day.    A pharmacist is available Monday - Friday from 8:30AM to 3:30PM to help answer any questions you may have about your prescriptions(s). Please call pharmacy at:    [facility]: [phone]  [facility]: [phone]  [facility]: [phone]              [facility]      Pain Infusion Aftercare Instructions      1. It is normal to feel sedated, tired and low in energy after a pain infusion. DO NOT DRIVE, OPERATE ANY MACHINERY, OR MAKE ANY IMPORTANT DECISIONS FOR AT LEAST 24 HOURS AFTER THE INFUSION.     2. Call the pain center at [phone] with any problems, questions, or concerns.     3. Eat light after the infusion. If you feel queasy or sick to your stomach, laying down with your eyes closed may help. When you resume eating start with something mild like clear liquids, yogurt, applesauce, crackers, etc… Gradually advance to a regular diet.     4. Do not leave your house alone the evening of your pain infusion.     5. No alcohol or sedative medications, such as sleeping pills, for 24 hours after your pain infusion.     6. Resume all other prescribed medications unless directed otherwise by you physician.     7. If you have any medical emergencies, call 911 or go directly to the closest emergency room.

## 2025-04-17 NOTE — PROGRESS NOTES
S: Patient here for 24 th opioid sparing pain infusion. Patient reports 90% reduction in pain after last infusion that lasted 2 days.    Purpose of pain infusion meds explained along with potential side effects.  Patient verbalized understanding.    B: Pain Issues. Is Patient breast feeding: No    A: Patient currently has pain described on flow sheet documentation. Designated  is Eliseo Cowart [phone]. Patient last ate solid food >8 hours ago, and had liquid >1 hours ago.    R: Plan; Obtain IV access, do patient risk assessment, and start opioid sparing infusion as ordered. Monitoring for S/S of adverse reactions.    1518: Patient C/O feeling nauseated, Zofran given as ordered PRN.    1540: Patient is restful as of this time, tolerating pain infusion well, nausea has resolved. Continuing to monitor.    1600: Post infusion teaching provided. Patient verbalized understanding. VSS, Patient states pain is gone. Will assist patient to waiting car via wheelchair.

## 2025-04-21 ENCOUNTER — APPOINTMENT (OUTPATIENT)
Dept: OBSTETRICS AND GYNECOLOGY | Facility: CLINIC | Age: 45
End: 2025-04-21
Payer: COMMERCIAL

## 2025-04-21 ENCOUNTER — PREP FOR PROCEDURE (OUTPATIENT)
Dept: OBSTETRICS AND GYNECOLOGY | Facility: CLINIC | Age: 45
End: 2025-04-21

## 2025-04-21 VITALS
DIASTOLIC BLOOD PRESSURE: 80 MMHG | WEIGHT: 210 LBS | HEIGHT: 66 IN | SYSTOLIC BLOOD PRESSURE: 117 MMHG | BODY MASS INDEX: 33.75 KG/M2

## 2025-04-21 DIAGNOSIS — R10.2 PELVIC PAIN IN FEMALE: ICD-10-CM

## 2025-04-21 DIAGNOSIS — N92.0 MENORRHAGIA WITH REGULAR CYCLE: Primary | ICD-10-CM

## 2025-04-21 DIAGNOSIS — Z71.6 ENCOUNTER FOR SMOKING CESSATION COUNSELING: ICD-10-CM

## 2025-04-21 DIAGNOSIS — Z01.818 PREOPERATIVE EXAM FOR GYNECOLOGIC SURGERY: ICD-10-CM

## 2025-04-21 DIAGNOSIS — N39.41 URGENCY INCONTINENCE: ICD-10-CM

## 2025-04-21 DIAGNOSIS — R10.2 FEMALE PELVIC PAIN: ICD-10-CM

## 2025-04-21 PROCEDURE — 99214 OFFICE O/P EST MOD 30 MIN: CPT | Performed by: OBSTETRICS & GYNECOLOGY

## 2025-04-21 PROCEDURE — 99406 BEHAV CHNG SMOKING 3-10 MIN: CPT | Performed by: OBSTETRICS & GYNECOLOGY

## 2025-04-21 PROCEDURE — 3008F BODY MASS INDEX DOCD: CPT | Performed by: OBSTETRICS & GYNECOLOGY

## 2025-04-21 PROCEDURE — 4004F PT TOBACCO SCREEN RCVD TLK: CPT | Performed by: OBSTETRICS & GYNECOLOGY

## 2025-04-21 ASSESSMENT — ENCOUNTER SYMPTOMS: ABDOMINAL PAIN: 1

## 2025-04-21 NOTE — PROGRESS NOTES
Subjective   Patient ID: Iraida La is a 44 y.o. female who presents for Menorrhagia (Heavy periods cramping, and pelvic pain inside stomach can't hold urine).  HPI  Patient with ongoing complaints of menorrhagia and severe dysmenorrhea.  Patient notes that she bleeds on a regular basis every 28 days for 6 to 8 days.  Cycles are heavy uses a pad and a tampon together.  Occasionally bleeds through.  Complaining of pain 8-10 out of 10.  Suprapubic sharp stabbing without radiation throughout the cycle.  Has prior CT scan and ultrasound which are reviewed.  Ultrasound was suggestive of adenomyosis.  This has been an ongoing problem but seems worse over the last few months.  Patient complaining of urinary urgency and losing her urine at night.  Has been going on for approximately a year.  Chart reviewed.    Review of Systems   Gastrointestinal:  Positive for abdominal pain.   Genitourinary:  Positive for menstrual problem, pelvic pain and urgency.   All other systems reviewed and are negative.      Objective   Physical Exam  Vitals reviewed.   Constitutional:       Appearance: Normal appearance. She is obese.   Cardiovascular:      Rate and Rhythm: Normal rate and regular rhythm.   Pulmonary:      Effort: Pulmonary effort is normal.      Breath sounds: Normal breath sounds.   Abdominal:      General: Bowel sounds are normal. There is no distension.      Palpations: Abdomen is soft. There is no mass.      Tenderness: There is no abdominal tenderness.   Genitourinary:     Comments: External genitalia unremarkable  Vagina clear small cystocele rectocele.  Cervix closed uterus normal anteverted  Adnexa masses or tenderness  Perineum without swelling or inflammation  Musculoskeletal:      Cervical back: Normal range of motion and neck supple.   Neurological:      General: No focal deficit present.      Mental Status: She is alert.   Psychiatric:         Mood and Affect: Mood normal.         Behavior: Behavior normal.          Thought Content: Thought content normal.         Judgment: Judgment normal.     Assessment/Plan   Diagnoses and all orders for this visit:  Menorrhagia with regular cycle  Female pelvic pain  Urgency incontinence  Preoperative exam for gynecologic surgery     Chart reviewed.  Patient with ongoing issues which have become worse over the last few months.  Menorrhagia with severe dysmenorrhea, pelvic pain, urgency and incontinence.  Reviewed options with patient.  Discussed hormonal therapy for suspected adenomyosis versus surgical management.  At this point patient does not wish to take any hormones would like to proceed with surgery.  Cycles are very regular although heavy.  Discussed doing robotically assisted laparoscopic hysterectomy.  Risks and benefits of procedure reviewed with patient.  Also will refer to GYN urology for evaluation of incontinence and urgency.  Will do this prior to the procedure in case surgical correction required.  Discussed smoking cessation with patient.  Patient looking occasionally.  Is not terribly interested at this time but is considering quitting.  5-minute conversation    Xiang Pinto MD 04/21/25 10:06 AM

## 2025-04-23 ENCOUNTER — TELEPHONE (OUTPATIENT)
Dept: OBSTETRICS AND GYNECOLOGY | Facility: CLINIC | Age: 45
End: 2025-04-23
Payer: COMMERCIAL

## 2025-04-23 PROBLEM — R10.2 PELVIC PAIN IN FEMALE: Status: ACTIVE | Noted: 2025-04-21

## 2025-04-23 NOTE — TELEPHONE ENCOUNTER
Patient is scheduled for surgery on 7/30/2025  . She will need to sign the Hysterectomy consent form . She will be stopping by the [facility] office on 4/23 - 4/25. Office is being notified to have consent ready for patient to sign . Thank you .

## 2025-05-08 ENCOUNTER — INFUSION (OUTPATIENT)
Dept: INFUSION THERAPY | Facility: CLINIC | Age: 45
End: 2025-05-08
Payer: COMMERCIAL

## 2025-05-08 VITALS
OXYGEN SATURATION: 98 % | TEMPERATURE: 97.2 F | HEART RATE: 78 BPM | RESPIRATION RATE: 14 BRPM | SYSTOLIC BLOOD PRESSURE: 108 MMHG | DIASTOLIC BLOOD PRESSURE: 72 MMHG

## 2025-05-08 DIAGNOSIS — M79.7 FIBROMYALGIA: ICD-10-CM

## 2025-05-08 PROCEDURE — 96365 THER/PROPH/DIAG IV INF INIT: CPT | Mod: INF

## 2025-05-08 PROCEDURE — 2500000004 HC RX 250 GENERAL PHARMACY W/ HCPCS (ALT 636 FOR OP/ED): Performed by: NURSE PRACTITIONER

## 2025-05-08 PROCEDURE — 96375 TX/PRO/DX INJ NEW DRUG ADDON: CPT | Mod: INF

## 2025-05-08 PROCEDURE — 96368 THER/DIAG CONCURRENT INF: CPT | Mod: INF

## 2025-05-08 RX ORDER — METOCLOPRAMIDE HYDROCHLORIDE 5 MG/ML
10 INJECTION INTRAMUSCULAR; INTRAVENOUS ONCE
OUTPATIENT
Start: 2025-05-29 | End: 2025-05-29

## 2025-05-08 RX ORDER — ONDANSETRON HYDROCHLORIDE 2 MG/ML
4 INJECTION, SOLUTION INTRAVENOUS ONCE
Status: COMPLETED | OUTPATIENT
Start: 2025-05-08 | End: 2025-05-08

## 2025-05-08 RX ORDER — FAMOTIDINE 10 MG/ML
20 INJECTION, SOLUTION INTRAVENOUS ONCE AS NEEDED
OUTPATIENT
Start: 2025-05-29

## 2025-05-08 RX ORDER — NITROGLYCERIN 0.4 MG/1
0.4 TABLET SUBLINGUAL ONCE
OUTPATIENT
Start: 2025-05-29 | End: 2025-05-29

## 2025-05-08 RX ORDER — DIPHENHYDRAMINE HYDROCHLORIDE 50 MG/ML
25 INJECTION, SOLUTION INTRAMUSCULAR; INTRAVENOUS ONCE
OUTPATIENT
Start: 2025-05-29 | End: 2025-05-29

## 2025-05-08 RX ORDER — DIPHENHYDRAMINE HYDROCHLORIDE 50 MG/ML
50 INJECTION, SOLUTION INTRAMUSCULAR; INTRAVENOUS AS NEEDED
OUTPATIENT
Start: 2025-05-29

## 2025-05-08 RX ORDER — ONDANSETRON HYDROCHLORIDE 2 MG/ML
4 INJECTION, SOLUTION INTRAVENOUS ONCE
OUTPATIENT
Start: 2025-05-29 | End: 2025-05-29

## 2025-05-08 RX ORDER — ALBUTEROL SULFATE 0.83 MG/ML
3 SOLUTION RESPIRATORY (INHALATION) AS NEEDED
OUTPATIENT
Start: 2025-05-29

## 2025-05-08 RX ORDER — EPINEPHRINE 0.3 MG/.3ML
0.3 INJECTION SUBCUTANEOUS EVERY 5 MIN PRN
OUTPATIENT
Start: 2025-05-29

## 2025-05-08 RX ORDER — METOPROLOL TARTRATE 25 MG/1
25 TABLET, FILM COATED ORAL ONCE
OUTPATIENT
Start: 2025-05-29 | End: 2025-05-29

## 2025-05-08 RX ADMIN — Medication: at 15:42

## 2025-05-08 RX ADMIN — PROPOFOL 100 MG: 10 INJECTION, EMULSION INTRAVENOUS at 15:43

## 2025-05-08 RX ADMIN — ONDANSETRON 4 MG: 2 INJECTION INTRAMUSCULAR; INTRAVENOUS at 15:40

## 2025-05-08 ASSESSMENT — ENCOUNTER SYMPTOMS
LOSS OF SENSATION IN FEET: 1
OCCASIONAL FEELINGS OF UNSTEADINESS: 0
DEPRESSION: 1

## 2025-05-08 ASSESSMENT — PAIN SCALES - GENERAL
PAINLEVEL_OUTOF10: 0 - NO PAIN
PAINLEVEL_OUTOF10: 10 - WORST POSSIBLE PAIN

## 2025-05-08 NOTE — PROGRESS NOTES
S: Patient here for 25th opioid sparing pain infusion. Patient reports 80% reduction in pain after last infusion that lasted 2 days    Purpose of pain infusion meds explained along with potential side effects.  Patient verbalized understanding.    B: Pain Issues.   Lower back   Patient breast feeding: no      Is Patient receiving any other form Ketamine from any other facility/ outside clinic ?: no  A: Patient currently has pain described on flow sheet documentation. Designated  is Eliseo Cowart @ [phone]. Patient last ate solid food >4 hours ago, and had liquid 1 hours ago.    R: Plan; Obtain IV access, do patient risk assessment, and start opioid sparing infusion as ordered. Monitoring for S/S of adverse reactions.    1626- Post infusion teaching provided. Patient verbalized understanding. VSS, Patient states pain is 0. Will assist patient to waiting car via wheelchair.

## 2025-05-08 NOTE — PATIENT INSTRUCTIONS
Today :We administered ondansetron, (ketamine 30 mg, lidocaine (Xylocaine) 300 mg in sodium chloride 0.9% 518 mL IV), and propofol (Diprivan) 100 mg in dextrose 5% 25 mL IV.     For:   1. Fibromyalgia          (Tell all doctors including dentists that you are taking this medication)     Go to the emergency room or call 911 if:  -You have signs of allergic reaction:   -Rash, hives, itching.   -Swollen, blistered, peeling skin.   -Swelling of face, lips, mouth, tongue or throat.   -Tightness of chest, trouble breathing, swallowing or talking     Call your doctor:  - If IV / injection site gets red, warm, swollen, itchy or leaks fluid or pus.     (Leave dressing on your IV site for at least 2 hours and keep area clean and dry  - If you get sick or have symptoms of infection or are not feeling well for any reason.    (Wash your hands often, stay away from people who are sick)  - If you have side effects from your medication that do not go away or are bothersome.     (Refer to the teaching your nurse gave you for side effects to call your doctor about)    - Common side effects may include:  stuffy nose, headache, feeling tired, muscle aches, upset stomach  - Before receiving any vaccines     - Call [facility] at   If:  - You get sick, are on antibiotics, have had a recent vaccine, have surgery or dental work and your doctor wants your visit rescheduled.  - You need to cancel and reschedule your visit for any reason. Call at least 2 days before your visit if you need to cancel.   - Your insurance changes before your next visit.    (We will need to get approval from your new insurance. This can take up to two weeks.)     [facility] is opened Monday thru Friday. We are closed on weekends and holidays.   Voice mail will take your call if the center is closed. If you leave a message please allow 24 hours for a call back during weekdays. If you leave a message on a weekend/holiday, we will call  you back the next business day.    A pharmacist is available Monday - Friday from 8:30AM to 3:30PM to help answer any questions you may have about your prescriptions(s). Please call pharmacy at:    [facility]: [phone]  [facility]: [phone]  [facility]: [phone]              [facility]      Pain Infusion Aftercare Instructions      1. It is normal to feel sedated, tired and low in energy after a pain infusion. DO NOT DRIVE, OPERATE ANY MACHINERY, OR MAKE ANY IMPORTANT DECISIONS FOR AT LEAST 24 HOURS AFTER THE INFUSION.     2. Call the pain center at [phone] with any problems, questions, or concerns.     3. Eat light after the infusion. If you feel queasy or sick to your stomach, laying down with your eyes closed may help. When you resume eating start with something mild like clear liquids, yogurt, applesauce, crackers, etc… Gradually advance to a regular diet.     4. Do not leave your house alone the evening of your pain infusion.     5. No alcohol or sedative medications, such as sleeping pills, for 24 hours after your pain infusion.     6. Resume all other prescribed medications unless directed otherwise by you physician.     7. If you have any medical emergencies, call 911 or go directly to the closest emergency room.    Patient Instructions  IV Infusion  [facility]      1. A responsible adult must stay with you during your infusion and drive you home. If you do not have a responsible adult with transportation home, your appointment will be rescheduled. Patients must still have a responsible adult if they use Rideshare, Uber, or Lyft. Uber, Rideshare, or Lyft drivers do not qualify.   2. On the day of your infusion we ask that you please drink clear liquids until your appointment.  You should NOT eat food 4 hours before your infusion.    3. Take all medications as prescribed before your infusion. Do not withhold  blood pressure medication.   4. Patients who use a CPAP or BIPAP should bring it to the infusion appointment.   5. Children under 16 will not be permitted in the infusion clinic. Please do not bring young children or pets. For patients who must bring a service animal, paperwork will be required. NO EXCEPTIONS.  6.  All Women will be required to take a pregnancy test prior to the infusion unless they are above 55 years of age or have had a hysterectomy.   7. Patients who cancel their infusion should call the Infusion line at [phone] as soon as possible. We would appreciate a 48-hour notice. Please be on time for the appt. Patients who are more than 15 mins late will have to cancel and reschedule. Infusion appt times are minimal. Patients who do not show, cancel, or reschedule an appointment may have a long wait until we can get them back on the schedule.   8. We make every effort to schedule your infusions based on your physician's recommendations. However, factors will affect our infusion schedule and availability. We appreciate your understanding.  9. Appointments will only be scheduled for two appointments in the future.   10. No eating, drinking, or chewing gum during the infusion.   11. If you miss or cancel your infusion appointment it is YOUR responsibility to call us and reschedule.  Please call [phone] or [phone] to reschedule or cancel appointment

## 2025-05-22 DIAGNOSIS — M79.7 FIBROMYALGIA: Primary | ICD-10-CM

## 2025-06-23 ENCOUNTER — INFUSION (OUTPATIENT)
Dept: INFUSION THERAPY | Facility: CLINIC | Age: 45
End: 2025-06-23
Payer: COMMERCIAL

## 2025-06-23 VITALS
OXYGEN SATURATION: 97 % | DIASTOLIC BLOOD PRESSURE: 62 MMHG | TEMPERATURE: 97.2 F | SYSTOLIC BLOOD PRESSURE: 110 MMHG | HEART RATE: 90 BPM | RESPIRATION RATE: 16 BRPM

## 2025-06-23 DIAGNOSIS — M79.7 FIBROMYALGIA: Primary | ICD-10-CM

## 2025-06-23 PROCEDURE — 2500000004 HC RX 250 GENERAL PHARMACY W/ HCPCS (ALT 636 FOR OP/ED): Mod: JZ | Performed by: NURSE PRACTITIONER

## 2025-06-23 PROCEDURE — 96368 THER/DIAG CONCURRENT INF: CPT | Mod: INF

## 2025-06-23 PROCEDURE — 96375 TX/PRO/DX INJ NEW DRUG ADDON: CPT | Mod: INF

## 2025-06-23 PROCEDURE — 96365 THER/PROPH/DIAG IV INF INIT: CPT | Mod: INF

## 2025-06-23 RX ORDER — ONDANSETRON HYDROCHLORIDE 2 MG/ML
4 INJECTION, SOLUTION INTRAVENOUS ONCE
OUTPATIENT
Start: 2025-07-14 | End: 2025-07-14

## 2025-06-23 RX ORDER — NITROGLYCERIN 0.4 MG/1
0.4 TABLET SUBLINGUAL ONCE
OUTPATIENT
Start: 2025-07-14 | End: 2025-07-14

## 2025-06-23 RX ORDER — DIPHENHYDRAMINE HYDROCHLORIDE 50 MG/ML
25 INJECTION, SOLUTION INTRAMUSCULAR; INTRAVENOUS ONCE
OUTPATIENT
Start: 2025-07-14 | End: 2025-07-14

## 2025-06-23 RX ORDER — METOCLOPRAMIDE HYDROCHLORIDE 5 MG/ML
10 INJECTION INTRAMUSCULAR; INTRAVENOUS ONCE
OUTPATIENT
Start: 2025-07-14 | End: 2025-07-14

## 2025-06-23 RX ORDER — METOPROLOL TARTRATE 25 MG/1
25 TABLET, FILM COATED ORAL ONCE
OUTPATIENT
Start: 2025-07-14 | End: 2025-07-14

## 2025-06-23 RX ORDER — ALBUTEROL SULFATE 0.83 MG/ML
3 SOLUTION RESPIRATORY (INHALATION) AS NEEDED
OUTPATIENT
Start: 2025-07-14

## 2025-06-23 RX ORDER — DIPHENHYDRAMINE HYDROCHLORIDE 50 MG/ML
50 INJECTION, SOLUTION INTRAMUSCULAR; INTRAVENOUS AS NEEDED
OUTPATIENT
Start: 2025-07-14

## 2025-06-23 RX ORDER — FAMOTIDINE 10 MG/ML
20 INJECTION, SOLUTION INTRAVENOUS ONCE AS NEEDED
OUTPATIENT
Start: 2025-07-14

## 2025-06-23 RX ORDER — EPINEPHRINE 0.3 MG/.3ML
0.3 INJECTION SUBCUTANEOUS EVERY 5 MIN PRN
OUTPATIENT
Start: 2025-07-14

## 2025-06-23 RX ORDER — ONDANSETRON HYDROCHLORIDE 2 MG/ML
4 INJECTION, SOLUTION INTRAVENOUS ONCE
Status: COMPLETED | OUTPATIENT
Start: 2025-06-23 | End: 2025-06-23

## 2025-06-23 RX ADMIN — ONDANSETRON 4 MG: 2 INJECTION INTRAMUSCULAR; INTRAVENOUS at 15:24

## 2025-06-23 RX ADMIN — Medication: at 15:24

## 2025-06-23 RX ADMIN — PROPOFOL 100 MG: 10 INJECTION, EMULSION INTRAVENOUS at 15:24

## 2025-06-23 ASSESSMENT — PAIN SCALES - GENERAL
PAINLEVEL_OUTOF10: 0 - NO PAIN
PAINLEVEL_OUTOF10: 10 - WORST POSSIBLE PAIN

## 2025-06-23 ASSESSMENT — ENCOUNTER SYMPTOMS
DEPRESSION: 1
OCCASIONAL FEELINGS OF UNSTEADINESS: 1
LOSS OF SENSATION IN FEET: 1

## 2025-06-23 NOTE — PATIENT INSTRUCTIONS
Today :We administered (ketamine 30 mg, lidocaine (Xylocaine) 300 mg in sodium chloride 0.9% 518 mL IV), propofol (Diprivan) 100 mg in dextrose 5% 25 mL IV, and ondansetron.     For:   1. Fibromyalgia            (Tell all doctors including dentists that you are taking this medication)     Go to the emergency room or call 911 if:  -You have signs of allergic reaction:   -Rash, hives, itching.   -Swollen, blistered, peeling skin.   -Swelling of face, lips, mouth, tongue or throat.   -Tightness of chest, trouble breathing, swallowing or talking     Call your doctor:  - If IV / injection site gets red, warm, swollen, itchy or leaks fluid or pus.     (Leave dressing on your IV site for at least 2 hours and keep area clean and dry  - If you get sick or have symptoms of infection or are not feeling well for any reason.    (Wash your hands often, stay away from people who are sick)  - If you have side effects from your medication that do not go away or are bothersome.     (Refer to the teaching your nurse gave you for side effects to call your doctor about)    - Common side effects may include:  stuffy nose, headache, feeling tired, muscle aches, upset stomach  - Before receiving any vaccines     - Call [facility] at   If:  - You get sick, are on antibiotics, have had a recent vaccine, have surgery or dental work and your doctor wants your visit rescheduled.  - You need to cancel and reschedule your visit for any reason. Call at least 2 days before your visit if you need to cancel.   - Your insurance changes before your next visit.    (We will need to get approval from your new insurance. This can take up to two weeks.)     [facility] is opened Monday thru Friday. We are closed on weekends and holidays.   Voice mail will take your call if the center is closed. If you leave a message please allow 24 hours for a call back during weekdays. If you leave a message on a weekend/holiday, we will call  you back the next business day.    A pharmacist is available Monday - Friday from 8:30AM to 3:30PM to help answer any questions you may have about your prescriptions(s). Please call pharmacy at:    [facility]: [phone]  [facility]: [phone]  [facility]: [phone]              [facility]      Pain Infusion Aftercare Instructions      1. It is normal to feel sedated, tired and low in energy after a pain infusion. DO NOT DRIVE, OPERATE ANY MACHINERY, OR MAKE ANY IMPORTANT DECISIONS FOR AT LEAST 24 HOURS AFTER THE INFUSION.     2. Call the pain center at [phone] with any problems, questions, or concerns.     3. Eat light after the infusion. If you feel queasy or sick to your stomach, laying down with your eyes closed may help. When you resume eating start with something mild like clear liquids, yogurt, applesauce, crackers, etc… Gradually advance to a regular diet.     4. Do not leave your house alone the evening of your pain infusion.     5. No alcohol or sedative medications, such as sleeping pills, for 24 hours after your pain infusion.     6. Resume all other prescribed medications unless directed otherwise by you physician.     7. If you have any medical emergencies, call 911 or go directly to the closest emergency room.    Patient Instructions  IV Infusion  [facility]      1. A responsible adult must stay with you during your infusion and drive you home. If you do not have a responsible adult with transportation home, your appointment will be rescheduled. Patients must still have a responsible adult if they use [employer]ides[employer], Uber, or Lyft. Uber, Rideshare, or Lyft drivers do not qualify.   2. On the day of your infusion we ask that you please drink clear liquids until your appointment.  You should NOT eat food 4 hours before your infusion.    3. Take all medications as prescribed before your infusion. Do not withhold  blood pressure medication.   4. Patients who use a CPAP or BIPAP should bring it to the infusion appointment.   5. Children under 16 will not be permitted in the infusion clinic. Please do not bring young children or pets. For patients who must bring a service animal, paperwork will be required. NO EXCEPTIONS.  6.  All Women will be required to take a pregnancy test prior to the infusion unless they are above 55 years of age or have had a hysterectomy.   7. Patients who cancel their infusion should call the Infusion line at [phone] as soon as possible. We would appreciate a 48-hour notice. Please be on time for the appt. Patients who are more than 15 mins late will have to cancel and reschedule. Infusion appt times are minimal. Patients who do not show, cancel, or reschedule an appointment may have a long wait until we can get them back on the schedule.   8. We make every effort to schedule your infusions based on your physician's recommendations. However, factors will affect our infusion schedule and availability. We appreciate your understanding.  9. Appointments will only be scheduled for two appointments in the future.   10. No eating, drinking, or chewing gum during the infusion.   11. If you miss or cancel your infusion appointment it is YOUR responsibility to call us and reschedule.  Please call [phone] or [phone] to reschedule or cancel appointment

## 2025-06-23 NOTE — PROGRESS NOTES
S: Patient here for 26th opioid sparing pain infusion. Patient reports 80% reduction in pain after last infusion that lasted 2 days.    Purpose of pain infusion meds explained along with potential side effects.  Patient verbalized understanding.    B: Pain Issues in lower and mid back, upper right shoulder   Is Patient breast feeding: no      Is Patient receiving any other form Ketamine from any other facility/ outside clinic ?: no  A: Patient currently has pain described on flow sheet documentation. Designated  is Pinedasusan Cowart at [phone]. Patient last ate solid food >4 hours ago, and had liquid 1 hours ago.    R: Plan; Obtain IV access, do patient risk assessment, and start opioid sparing infusion as ordered. Monitoring for S/S of adverse reactions.    1608- Post infusion teaching provided. Patient verbalized understanding. VSS, Patient states pain is 0. Will assist patient to waiting car via wheelchair.

## 2025-07-07 ENCOUNTER — TELEMEDICINE (OUTPATIENT)
Dept: PAIN MEDICINE | Facility: CLINIC | Age: 45
End: 2025-07-07
Payer: COMMERCIAL

## 2025-07-07 DIAGNOSIS — G89.29 CHRONIC BILATERAL LOW BACK PAIN WITH BILATERAL SCIATICA: ICD-10-CM

## 2025-07-07 DIAGNOSIS — M54.41 CHRONIC BILATERAL LOW BACK PAIN WITH BILATERAL SCIATICA: ICD-10-CM

## 2025-07-07 DIAGNOSIS — M79.7 FIBROMYALGIA: Primary | ICD-10-CM

## 2025-07-07 DIAGNOSIS — M54.42 CHRONIC BILATERAL LOW BACK PAIN WITH BILATERAL SCIATICA: ICD-10-CM

## 2025-07-07 DIAGNOSIS — E11.42 TYPE 2 DIABETES MELLITUS WITH PERIPHERAL NEUROPATHY: Chronic | ICD-10-CM

## 2025-07-07 PROBLEM — F17.200 NICOTINE DEPENDENCE: Status: ACTIVE | Noted: 2022-10-31

## 2025-07-07 PROBLEM — D64.9 ANEMIA: Status: ACTIVE | Noted: 2019-12-03

## 2025-07-07 PROBLEM — R29.898 WEAKNESS OF HAND: Status: ACTIVE | Noted: 2018-01-06

## 2025-07-07 PROBLEM — R26.9 GAIT DIFFICULTY: Status: ACTIVE | Noted: 2018-04-27

## 2025-07-07 PROBLEM — S39.012A STRAIN OF MUSCLE, FASCIA AND TENDON OF LOWER BACK, INITIAL ENCOUNTER: Status: ACTIVE | Noted: 2025-07-07

## 2025-07-07 PROBLEM — Z59.41 FOOD INSECURITY: Status: ACTIVE | Noted: 2024-03-13

## 2025-07-07 PROBLEM — Z55.5: Status: ACTIVE | Noted: 2024-03-13

## 2025-07-07 PROBLEM — R91.8 PULMONARY INFILTRATES ON CXR: Status: ACTIVE | Noted: 2023-03-20

## 2025-07-07 PROBLEM — J45.909 ASTHMA: Status: ACTIVE | Noted: 2023-03-20

## 2025-07-07 PROBLEM — M54.12 CERVICAL RADICULOPATHY: Status: ACTIVE | Noted: 2025-02-25

## 2025-07-07 PROBLEM — M25.511 PAIN OF BOTH SHOULDER JOINTS: Status: ACTIVE | Noted: 2025-02-25

## 2025-07-07 PROBLEM — O24.419 GESTATIONAL DIABETES: Status: ACTIVE | Noted: 2017-12-19

## 2025-07-07 PROBLEM — F32.A DEPRESSION: Status: ACTIVE | Noted: 2019-09-30

## 2025-07-07 PROBLEM — M54.32 LEFT SIDED SCIATICA: Status: ACTIVE | Noted: 2018-04-27

## 2025-07-07 PROBLEM — U07.1 DISEASE DUE TO SEVERE ACUTE RESPIRATORY SYNDROME CORONAVIRUS 2 (SARS-COV-2): Status: ACTIVE | Noted: 2021-12-01

## 2025-07-07 PROBLEM — M19.019 AC JOINT ARTHROPATHY: Status: ACTIVE | Noted: 2025-02-25

## 2025-07-07 PROBLEM — E78.00 ELEVATED LOW DENSITY LIPOPROTEIN (LDL) CHOLESTEROL LEVEL: Status: ACTIVE | Noted: 2025-01-08

## 2025-07-07 PROBLEM — R53.81 PHYSICAL DECONDITIONING: Status: ACTIVE | Noted: 2019-09-30

## 2025-07-07 PROBLEM — R70.0 ELEVATED ERYTHROCYTE SEDIMENTATION RATE: Status: ACTIVE | Noted: 2024-09-13

## 2025-07-07 PROBLEM — K31.84 GASTROPARESIS: Status: ACTIVE | Noted: 2024-03-13

## 2025-07-07 PROBLEM — R73.03 PREDIABETES: Status: ACTIVE | Noted: 2025-01-08

## 2025-07-07 PROBLEM — F41.1 GENERALIZED ANXIETY DISORDER: Status: ACTIVE | Noted: 2024-03-13

## 2025-07-07 PROBLEM — T78.40XA: Status: ACTIVE | Noted: 2024-09-13

## 2025-07-07 PROBLEM — J30.1 ALLERGIC REACTION TO GRASS POLLEN: Status: ACTIVE | Noted: 2024-09-13

## 2025-07-07 PROBLEM — E61.1 HYPOFERREMIA: Status: ACTIVE | Noted: 2024-09-13

## 2025-07-07 PROBLEM — E11.65 TYPE 2 DIABETES MELLITUS WITH HYPERGLYCEMIA, WITHOUT LONG-TERM CURRENT USE OF INSULIN: Chronic | Status: ACTIVE | Noted: 2018-01-06

## 2025-07-07 PROBLEM — I15.2 HYPERTENSION ASSOCIATED WITH TYPE 2 DIABETES MELLITUS: Status: ACTIVE | Noted: 2024-03-13

## 2025-07-07 PROBLEM — K04.7 DENTAL INFECTION: Status: ACTIVE | Noted: 2024-11-22

## 2025-07-07 PROBLEM — G90.9 DISORDER OF AUTONOMIC NERVOUS SYSTEM: Status: ACTIVE | Noted: 2024-01-29

## 2025-07-07 PROBLEM — F33.1 MAJOR DEPRESSIVE DISORDER, RECURRENT, MODERATE: Chronic | Status: ACTIVE | Noted: 2024-03-13

## 2025-07-07 PROBLEM — D47.2 IGA MONOCLONAL GAMMOPATHY OF UNCERTAIN SIGNIFICANCE: Status: ACTIVE | Noted: 2024-09-13

## 2025-07-07 PROBLEM — G35 MULTIPLE SCLEROSIS (MULTI): Chronic | Status: ACTIVE | Noted: 2024-03-13

## 2025-07-07 PROBLEM — R59.0 LYMPHADENOPATHY, CERVICAL: Status: ACTIVE | Noted: 2019-05-29

## 2025-07-07 PROBLEM — M25.532 PAIN IN LEFT WRIST: Status: ACTIVE | Noted: 2018-10-24

## 2025-07-07 PROBLEM — E11.59 HYPERTENSION ASSOCIATED WITH TYPE 2 DIABETES MELLITUS: Status: ACTIVE | Noted: 2024-03-13

## 2025-07-07 PROBLEM — M25.552 HIP PAIN, ACUTE, LEFT: Status: ACTIVE | Noted: 2018-01-06

## 2025-07-07 PROBLEM — K52.1 GASTROENTERITIS DUE TO FOOD TOXIN: Status: ACTIVE | Noted: 2020-10-24

## 2025-07-07 PROBLEM — B94.8 POST-VIRAL DISORDER: Status: ACTIVE | Noted: 2024-01-29

## 2025-07-07 PROBLEM — J02.9 SORE THROAT: Status: ACTIVE | Noted: 2019-07-11

## 2025-07-07 PROBLEM — R05.9 COUGH: Status: ACTIVE | Noted: 2023-08-29

## 2025-07-07 PROBLEM — I95.1 ORTHOSTATIC HYPOTENSION: Status: ACTIVE | Noted: 2024-03-13

## 2025-07-07 PROBLEM — R79.9: Status: ACTIVE | Noted: 2024-09-05

## 2025-07-07 PROBLEM — E11.9 DIABETES MELLITUS (MULTI): Status: ACTIVE | Noted: 2024-09-05

## 2025-07-07 PROBLEM — R22.1 NODULE OF NECK: Status: ACTIVE | Noted: 2019-05-15

## 2025-07-07 PROBLEM — M25.50 MULTIPLE JOINT PAIN: Status: ACTIVE | Noted: 2017-12-19

## 2025-07-07 PROBLEM — J45.50 SEVERE PERSISTENT ASTHMA: Status: ACTIVE | Noted: 2024-03-13

## 2025-07-07 PROBLEM — E78.2 MIXED HYPERLIPIDEMIA: Status: ACTIVE | Noted: 2024-09-05

## 2025-07-07 PROBLEM — G62.9 SMALL FIBER NEUROPATHY: Status: ACTIVE | Noted: 2018-03-15

## 2025-07-07 PROBLEM — R20.0 NUMBNESS ON LEFT SIDE: Status: ACTIVE | Noted: 2024-01-29

## 2025-07-07 PROBLEM — M25.512 PAIN OF BOTH SHOULDER JOINTS: Status: ACTIVE | Noted: 2025-02-25

## 2025-07-07 PROBLEM — E66.9 OBESITY WITH BODY MASS INDEX 30 OR GREATER: Status: ACTIVE | Noted: 2024-03-13

## 2025-07-07 PROBLEM — E86.0 DEHYDRATION: Status: ACTIVE | Noted: 2020-10-24

## 2025-07-07 PROBLEM — J44.1 ACUTE EXACERBATION OF CHRONIC OBSTRUCTIVE PULMONARY DISEASE (MULTI): Status: ACTIVE | Noted: 2019-11-21

## 2025-07-07 PROBLEM — R60.0 LOCALIZED EDEMA: Status: ACTIVE | Noted: 2019-08-20

## 2025-07-07 PROBLEM — R03.0 ELEVATED BLOOD PRESSURE READING: Status: ACTIVE | Noted: 2017-12-19

## 2025-07-07 PROBLEM — E61.1 IRON DEFICIENCY: Status: ACTIVE | Noted: 2025-01-08

## 2025-07-07 PROCEDURE — 4004F PT TOBACCO SCREEN RCVD TLK: CPT | Performed by: NURSE PRACTITIONER

## 2025-07-07 PROCEDURE — 99212 OFFICE O/P EST SF 10 MIN: CPT | Performed by: NURSE PRACTITIONER

## 2025-07-07 RX ORDER — GABAPENTIN 100 MG/1
100 CAPSULE ORAL 3 TIMES DAILY
Qty: 90 CAPSULE | Refills: 11 | Status: SHIPPED | OUTPATIENT
Start: 2025-07-07

## 2025-07-07 RX ORDER — GABAPENTIN 300 MG/1
300 CAPSULE ORAL 3 TIMES DAILY
Qty: 90 CAPSULE | Refills: 11 | Status: SHIPPED | OUTPATIENT
Start: 2025-07-07 | End: 2026-07-07

## 2025-07-07 SDOH — ECONOMIC STABILITY: FOOD INSECURITY: WITHIN THE PAST 12 MONTHS, THE FOOD YOU BOUGHT JUST DIDN'T LAST AND YOU DIDN'T HAVE MONEY TO GET MORE.: NEVER TRUE

## 2025-07-07 SDOH — ECONOMIC STABILITY: FOOD INSECURITY: WITHIN THE PAST 12 MONTHS, YOU WORRIED THAT YOUR FOOD WOULD RUN OUT BEFORE YOU GOT MONEY TO BUY MORE.: NEVER TRUE

## 2025-07-07 ASSESSMENT — ENCOUNTER SYMPTOMS
BACK PAIN: 1
WHEEZING: 0
WEAKNESS: 0
ARTHRALGIAS: 1
LOSS OF SENSATION IN FEET: 1
CONSTIPATION: 0
PALPITATIONS: 0
FATIGUE: 0
OCCASIONAL FEELINGS OF UNSTEADINESS: 1
CHEST TIGHTNESS: 0
AGITATION: 0
CHILLS: 0
NAUSEA: 0
DEPRESSION: 0
MYALGIAS: 1
SHORTNESS OF BREATH: 0
NUMBNESS: 1
DIARRHEA: 0
DIZZINESS: 0
FREQUENCY: 0
CONFUSION: 0

## 2025-07-07 ASSESSMENT — ANXIETY QUESTIONNAIRES
IF YOU CHECKED OFF ANY PROBLEMS ON THIS QUESTIONNAIRE, HOW DIFFICULT HAVE THESE PROBLEMS MADE IT FOR YOU TO DO YOUR WORK, TAKE CARE OF THINGS AT HOME, OR GET ALONG WITH OTHER PEOPLE: SOMEWHAT DIFFICULT
GAD7 TOTAL SCORE: 6
6. BECOMING EASILY ANNOYED OR IRRITABLE: SEVERAL DAYS
3. WORRYING TOO MUCH ABOUT DIFFERENT THINGS: SEVERAL DAYS
1. FEELING NERVOUS, ANXIOUS, OR ON EDGE: NOT AT ALL
5. BEING SO RESTLESS THAT IT IS HARD TO SIT STILL: SEVERAL DAYS
7. FEELING AFRAID AS IF SOMETHING AWFUL MIGHT HAPPEN: SEVERAL DAYS
2. NOT BEING ABLE TO STOP OR CONTROL WORRYING: SEVERAL DAYS
4. TROUBLE RELAXING: SEVERAL DAYS

## 2025-07-07 ASSESSMENT — PAIN - FUNCTIONAL ASSESSMENT: PAIN_FUNCTIONAL_ASSESSMENT: 0-10

## 2025-07-07 ASSESSMENT — LIFESTYLE VARIABLES
HOW MANY STANDARD DRINKS CONTAINING ALCOHOL DO YOU HAVE ON A TYPICAL DAY: 1 OR 2
AUDIT-C TOTAL SCORE: 1
HOW OFTEN DO YOU HAVE A DRINK CONTAINING ALCOHOL: MONTHLY OR LESS
HOW OFTEN DO YOU HAVE SIX OR MORE DRINKS ON ONE OCCASION: NEVER
SKIP TO QUESTIONS 9-10: 1

## 2025-07-07 ASSESSMENT — PAIN DESCRIPTION - DESCRIPTORS: DESCRIPTORS: RADIATING;SHARP;SHOOTING

## 2025-07-07 ASSESSMENT — PATIENT HEALTH QUESTIONNAIRE - PHQ9
SUM OF ALL RESPONSES TO PHQ9 QUESTIONS 1 & 2: 0
2. FEELING DOWN, DEPRESSED OR HOPELESS: NOT AT ALL
1. LITTLE INTEREST OR PLEASURE IN DOING THINGS: NOT AT ALL

## 2025-07-07 ASSESSMENT — PAIN SCALES - GENERAL: PAINLEVEL_OUTOF10: 8

## 2025-07-07 NOTE — PROGRESS NOTES
Opioid Risk Assessment Score 3/26  August Each Box that Applies Female Male       FAMILY HISTORY OF SUBSTANCE ABUSE  August the boxes that applies   Alcohol ?  1    ? 3   Illegal drugs ?  2 ? 3   Rx drugs ?  4 ? 4   PERSONAL HISTORY OF SUBSTNACE ABUSE   Alcohol ?  3 ?  3   Illegal drugs ?  4 ?  4    Rx drugs ?  5 ?  5   Age Between 16-45 years x  1 ?  1   History of Preadolescent Sexual Abuse ?  3 ?  0   PSYCHOLOGIC DISEASE   ADD, OCD, bipolar, schizophrenia ?  2 ?  2   Depression x  1 ?  1   Scoring Totals       Scoring (Risk)  0-3 - Low  4-7 - Moderate  8 - High    Opioid Risk Assessment Score 3/26
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
## 2025-07-22 ENCOUNTER — CLINICAL SUPPORT (OUTPATIENT)
Dept: PREADMISSION TESTING | Facility: HOSPITAL | Age: 45
End: 2025-07-22
Payer: COMMERCIAL

## 2025-07-22 RX ORDER — FOLIC ACID 1 MG/1
1 TABLET ORAL
COMMUNITY
Start: 2024-09-05

## 2025-07-22 RX ORDER — MOMETASONE FUROATE AND FORMOTEROL FUMARATE DIHYDRATE 100; 5 UG/1; UG/1
1 AEROSOL RESPIRATORY (INHALATION) AS NEEDED
COMMUNITY
Start: 2024-10-13

## 2025-07-22 NOTE — CPM/PAT NURSE NOTE
CPM/PAT Nurse Note      Name: Iraida La (Iraida La)  /Age: 1980/45 y.o.       Medical History[1]    Surgical History[2]    Patient  reports being sexually active and has had partner(s) who are male. She reports using the following method of birth control/protection: None.    Family History[3]    Allergies[4]    Prior to Admission medications   Medication Sig Start Date End Date Taking? Authorizing Provider   folic acid (Folvite) 1 mg tablet Take 1 tablet (1 mg) by mouth once daily. 24  Yes Historical Provider, MD   mometasone-formoterol (Dulera) 100-5 mcg/actuation inhaler Inhale 1 puff if needed. 10/13/24  Yes Historical Provider, MD   acetaminophen (Tylenol) 500 mg tablet TAKE 1 TABLET BY MOUTH EVERY 4 HOURS AS NEEDED FOR PAIN OR FEVER (SPECIFY). 23   Historical Provider, MD   ascorbic acid (Vitamin C) 250 mg tablet Take 1 tablet (250 mg) by mouth once daily. 23   Historical Provider, MD   carvedilol (Coreg) 12.5 mg tablet 1 tab(s) orally 2 times a day    Historical Provider, MD   cholecalciferol (Vitamin D-3) 1,250 mcg (50,000 unit) capsule TAKE ONE (1) CAPSULE BY MOUTH EVERY SATURDAY    Historical Provider, MD   cyanocobalamin, vitamin B-12, 1,000 mcg tablet, sublingual Take 1 tablet (1,000 mcg) by mouth once daily.  Patient not taking: Reported on 23   Junie Tobar MD PhD   cyclobenzaprine (Flexeril) 10 mg tablet TAKE 1 TABLET BY MOUTH AT BEDTIME AS NEEDED FOR MUSCLE SPASM 23   Historical Provider, MD   dexAMETHasone (Decadron) 6 mg tablet Take 1 tablet (6 mg) by mouth once daily. 23   Historical Provider, MD   dulaglutide (Trulicity) 1.5 mg/0.5 mL pen injector injection Inject 4.5 mg under the skin 1 (one) time per week. Every     Historical Provider, MD   DULoxetine (Cymbalta) 60 mg DR capsule  23   Historical Provider, MD   empagliflozin (JARDIANCE ORAL) Take 20 mg by mouth 1 time.    Historical Provider, MD   ferrous sulfate  325 (65 Fe) MG tablet TAKE 1 TABLET BY MOUTH EVERY DAY WITH BREAKFAST 9/19/23   Historical Provider, MD   FLUoxetine (PROzac) 40 mg capsule  9/26/23   Historical Provider, MD   fluticasone (Flonase) 50 mcg/actuation nasal spray 2 sprays by Does not apply route if needed. 4/11/19   Historical Provider, MD   fluticasone furoate-vilanteroL (Breo Ellipta) 100-25 mcg/dose inhaler 1 puff if needed.    Historical Provider, MD   gabapentin (Neurontin) 100 mg capsule Take 1 capsule (100 mg) by mouth 3 times a day. 7/7/25   CHRISTIANO Farnsworth   gabapentin (Neurontin) 300 mg capsule Take 1 capsule (300 mg) by mouth 3 times a day. 7/7/25 7/7/26  CHRISTIANO Farnsworth   Januvia 100 mg tablet Take 1 tablet (100 mg) by mouth once daily.  Patient not taking: Reported on 7/22/2025 9/19/23   Historical Provider, MD   lidocaine (Lidoderm) 5 % patch APPLY 1 PATCH TO SKIN AS NEEDED APPLY 12 HOURS ON 12 HOURS OFF 9/3/24   CHRISTIANO Farnsworth   metFORMIN (Glucophage) 1,000 mg tablet 2 times a day with meals. 9/26/23   Historical Provider, MD   metoclopramide (Reglan) 10 mg tablet TAKE 1 TABLET BEFORE MEALS TWICE DAILY AS NEEDED 9/8/23   Historical Provider, MD   midodrine (Proamatine) 2.5 mg tablet Take 1 tablet (2.5 mg) by mouth 2 times a day.  Patient not taking: Reported on 7/22/2025    Historical Provider, MD   montelukast (Singulair) 10 mg tablet Take 1 tablet (10 mg) by mouth once daily at bedtime. 11/5/19   Historical Provider, MD   Mucus Relief  mg 12 hr tablet Take 1 tablet (600 mg) by mouth if needed.    Historical Provider, MD   omeprazole (PriLOSEC) 40 mg DR capsule Take 1 capsule (40 mg) by mouth 2 times a day. 1/9/23   Historical Provider, MD   ondansetron ODT (Zofran-ODT) 4 mg disintegrating tablet Dissolve 1 tablet (4 mg) in the mouth every 6 hours if needed for nausea or vomiting. 8/27/23   Historical Provider, MD   OneTouch Delica Plus Lancet 33 gauge misc USE TO TEST TWICE DAILY AS NEEDED 9/8/23   Historical  Provider, MD   OneTouch Ultra Test strip  9/8/23   Historical Provider, MD   OneTouch Ultra2 Meter misc  9/8/23   Historical Provider, MD Maye Parson [facility] inhaler USE WITH INHALER AS NEEDED 10/10/22   Historical Provider, MD   potassium chloride CR 10 mEq ER tablet Take 1 tablet (10 mEq) by mouth once daily.  Patient not taking: Reported on 7/22/2025 12/9/22   Historical Provider, MD   Spiriva with HandiHaler 18 mcg inhalation capsule INHALE THE CONTENTS OF ONE (1) CAPSULE BY MOUTH VIA HANDIHALER ONCE DAILY AT THE SAME TIME EVERY DAY AS DIRECTED *DO NOT SWALLOW CAPSULE* 9/1/23   Historical Provider, MD   topiramate (Topamax) 100 mg tablet Take 1 tablet (100 mg) by mouth 2 times a day.    Historical Provider, MD   Ventolin HFA 90 mcg/actuation inhaler Inhale 2 puffs every 4 hours if needed.    Historical Provider, MD RHONDA SANDY     DASI Risk Score    No data to display  Caprini DVT Assessment    No data to display  Modified Frailty Index    No data to display  [id number] Stroke Risk Points  Current as of just now        N/A 0 to 9 Points:      Last Change: N/A          The [id number] risk score (Lip GH, et al. 2009. © 2010 [employer]) quantifies the risk of stroke for a patient with atrial fibrillation. For patients without atrial fibrillation or under the age of 18 this score appears as N/A. Higher score values generally indicate higher risk of stroke.        This score is not applicable to this patient. Components are not calculated.          Revised Cardiac Risk Index    No data to display  Apfel Simplified Score    No data to display  Risk Analysis Index Results This Encounter    No data found in the last 10 encounters.       Prodigy: High Risk  Total Score: 0          ARISCAT Score for Postoperative Pulmonary Complications    No data to display  Jacobs Perioperative Risk for Myocardial Infarction or Cardiac Arrest (WILFRIDO)    No data to display      Nurse Plan of Action:    RN screening call complete.  Reviewed allergies, medications and pharmacy, medical, surgical and social history with patient.  Chart updated.              [1]   Past Medical History:  Diagnosis Date    Anemia     iron deficiency H&H 12.8/38.8 4/12/25    Anxiety     Arthritis     Asthma     triggers heat/dust/reflux    Cervical disc disease     COPD (chronic obstructive pulmonary disease) (Multi)     Depression     Dysmenorrhea     menorrhagia    Fibromyalgia     pain infusions, ketamine, lidocaine, propofol, zofran    GERD (gastroesophageal reflux disease)     Hyperlipidemia     Hypertension     Liver disease     metabolic dysfuncton steatotic liver disease AST 11, ALT 53, Total [place] <0.2 4/12/25    Sleep apnea     CPAP    Type 2 diabetes mellitus     7/1/25 5.7 A1C   [2]   Past Surgical History:  Procedure Laterality Date    SALPINGOOPHORECTOMY     [3]   Family History  Problem Relation Name Age of Onset    COPD Mother      Heart disease Mother      Cancer Father      Heart disease Father      Diabetes Sister      No Known Problems Sister      No Known Problems Sister      No Known Problems Sister      Accidental death Brother     [4]   Allergies  Allergen Reactions    Aspirin Anaphylaxis and Hives    Erythromycin Anaphylaxis    Salicylates Anaphylaxis, Cough, Hives, Rash and Shortness of breath    Erythromycin Base Nausea/vomiting    Peanut Swelling    Toradol [Ketorolac] Hives and Itching

## 2025-07-23 ENCOUNTER — LAB (OUTPATIENT)
Dept: LAB | Facility: HOSPITAL | Age: 45
End: 2025-07-23
Payer: COMMERCIAL

## 2025-07-23 ENCOUNTER — PRE-ADMISSION TESTING (OUTPATIENT)
Dept: PREADMISSION TESTING | Facility: HOSPITAL | Age: 45
End: 2025-07-23
Payer: COMMERCIAL

## 2025-07-23 VITALS
RESPIRATION RATE: 16 BRPM | HEART RATE: 88 BPM | WEIGHT: 202.82 LBS | OXYGEN SATURATION: 97 % | TEMPERATURE: 98.6 F | DIASTOLIC BLOOD PRESSURE: 74 MMHG | SYSTOLIC BLOOD PRESSURE: 114 MMHG | BODY MASS INDEX: 32.6 KG/M2 | HEIGHT: 66 IN

## 2025-07-23 DIAGNOSIS — Z01.818 PREPROCEDURAL EXAMINATION: ICD-10-CM

## 2025-07-23 DIAGNOSIS — I10 HYPERTENSION, UNSPECIFIED TYPE: ICD-10-CM

## 2025-07-23 DIAGNOSIS — E11.65 TYPE 2 DIABETES MELLITUS WITH HYPERGLYCEMIA, WITHOUT LONG-TERM CURRENT USE OF INSULIN: Chronic | ICD-10-CM

## 2025-07-23 DIAGNOSIS — E53.8 VITAMIN B12 DEFICIENCY: ICD-10-CM

## 2025-07-23 DIAGNOSIS — N93.9 ABNORMAL UTERINE BLEEDING: Primary | ICD-10-CM

## 2025-07-23 DIAGNOSIS — K76.0 HEPATIC STEATOSIS: ICD-10-CM

## 2025-07-23 DIAGNOSIS — D64.9 ANEMIA, UNSPECIFIED TYPE: ICD-10-CM

## 2025-07-23 LAB
ABO GROUP (TYPE) IN BLOOD: NORMAL
ALBUMIN SERPL BCP-MCNC: 4.1 G/DL (ref 3.4–5)
ALP SERPL-CCNC: 53 U/L (ref 33–110)
ALT SERPL W P-5'-P-CCNC: 11 U/L (ref 7–45)
ANION GAP SERPL CALC-SCNC: 14 MMOL/L (ref 10–20)
ANTIBODY SCREEN: NORMAL
AST SERPL W P-5'-P-CCNC: 10 U/L (ref 9–39)
BILIRUB DIRECT SERPL-MCNC: 0 MG/DL (ref 0–0.3)
BILIRUB SERPL-MCNC: 0.2 MG/DL (ref 0–1.2)
BUN SERPL-MCNC: 17 MG/DL (ref 6–23)
CALCIUM SERPL-MCNC: 9.7 MG/DL (ref 8.6–10.3)
CHLORIDE SERPL-SCNC: 107 MMOL/L (ref 98–107)
CO2 SERPL-SCNC: 21 MMOL/L (ref 21–32)
CREAT SERPL-MCNC: 0.7 MG/DL (ref 0.5–1.05)
EGFRCR SERPLBLD CKD-EPI 2021: >90 ML/MIN/1.73M*2
ERYTHROCYTE [DISTWIDTH] IN BLOOD BY AUTOMATED COUNT: 14.3 % (ref 11.5–14.5)
EST. AVERAGE GLUCOSE BLD GHB EST-MCNC: 117 MG/DL
GLUCOSE SERPL-MCNC: 89 MG/DL (ref 74–99)
HBA1C MFR BLD: 5.7 % (ref ?–5.7)
HCT VFR BLD AUTO: 39.5 % (ref 36–46)
HGB BLD-MCNC: 13.1 G/DL (ref 12–16)
INR PPP: 1 (ref 0.9–1.1)
MCH RBC QN AUTO: 29.5 PG (ref 26–34)
MCHC RBC AUTO-ENTMCNC: 33.2 G/DL (ref 32–36)
MCV RBC AUTO: 89 FL (ref 80–100)
NRBC BLD-RTO: 0 /100 WBCS (ref 0–0)
PLATELET # BLD AUTO: 366 X10*3/UL (ref 150–450)
POTASSIUM SERPL-SCNC: 4.4 MMOL/L (ref 3.5–5.3)
PROT SERPL-MCNC: 7 G/DL (ref 6.4–8.2)
PROTHROMBIN TIME: 10.6 SECONDS (ref 9.8–12.4)
RBC # BLD AUTO: 4.44 X10*6/UL (ref 4–5.2)
RH FACTOR (ANTIGEN D): NORMAL
SODIUM SERPL-SCNC: 138 MMOL/L (ref 136–145)
WBC # BLD AUTO: 7.6 X10*3/UL (ref 4.4–11.3)

## 2025-07-23 PROCEDURE — 99204 OFFICE O/P NEW MOD 45 MIN: CPT | Performed by: NURSE PRACTITIONER

## 2025-07-23 PROCEDURE — 93005 ELECTROCARDIOGRAM TRACING: CPT | Performed by: NURSE PRACTITIONER

## 2025-07-23 PROCEDURE — 36415 COLL VENOUS BLD VENIPUNCTURE: CPT | Performed by: NURSE PRACTITIONER

## 2025-07-23 PROCEDURE — 86901 BLOOD TYPING SEROLOGIC RH(D): CPT

## 2025-07-23 PROCEDURE — 80053 COMPREHEN METABOLIC PANEL: CPT

## 2025-07-23 PROCEDURE — 93010 ELECTROCARDIOGRAM REPORT: CPT | Performed by: INTERNAL MEDICINE

## 2025-07-23 PROCEDURE — 85610 PROTHROMBIN TIME: CPT

## 2025-07-23 PROCEDURE — 86850 RBC ANTIBODY SCREEN: CPT

## 2025-07-23 PROCEDURE — 83036 HEMOGLOBIN GLYCOSYLATED A1C: CPT

## 2025-07-23 PROCEDURE — 85027 COMPLETE CBC AUTOMATED: CPT

## 2025-07-23 PROCEDURE — 87081 CULTURE SCREEN ONLY: CPT | Mod: AHULAB | Performed by: NURSE PRACTITIONER

## 2025-07-23 PROCEDURE — 86900 BLOOD TYPING SEROLOGIC ABO: CPT

## 2025-07-23 RX ORDER — CHLORHEXIDINE GLUCONATE ORAL RINSE 1.2 MG/ML
15 SOLUTION DENTAL DAILY
Qty: 30 ML | Refills: 0 | Status: SHIPPED | OUTPATIENT
Start: 2025-07-23 | End: 2025-07-25

## 2025-07-23 ASSESSMENT — ENCOUNTER SYMPTOMS
NECK NEGATIVE: 1
RESPIRATORY NEGATIVE: 1
NAUSEA: 1
ARTHRALGIAS: 1
RHINORRHEA: 1
AGITATION: 1
BRUISES/BLEEDS EASILY: 1
CARDIOVASCULAR NEGATIVE: 1
CONSTITUTIONAL NEGATIVE: 1

## 2025-07-23 NOTE — PREPROCEDURE INSTRUCTIONS
Medication List            Accurate as of July 23, 2025  2:37 PM. Always use your most recent med list.                acetaminophen 500 mg tablet  Commonly known as: Tylenol  Medication Adjustments for Surgery: Take/Use as prescribed     ascorbic acid 250 mg tablet  Commonly known as: Vitamin C  Additional Medication Adjustments for Surgery: Take last dose 7 days before surgery     carvedilol 12.5 mg tablet  Commonly known as: Coreg  Medication Adjustments for Surgery: Take on the morning of surgery     chlorhexidine 0.12 % solution  Commonly known as: Peridex  Use 15 mL in the mouth or throat once daily for 2 days.  Medication Adjustments for Surgery: Take/Use as prescribed     cholecalciferol 1.25 mg (50,000 units) capsule  Commonly known as: Vitamin D-3  Medication Adjustments for Surgery: Do Not take on the morning of surgery     cyanocobalamin (vitamin B-12) 1,000 mcg tablet, sublingual  Take 1 tablet (1,000 mcg) by mouth once daily.  Medication Adjustments for Surgery: Do Not take on the morning of surgery     cyclobenzaprine 10 mg tablet  Commonly known as: Flexeril  Medication Adjustments for Surgery: Do Not take on the morning of surgery     dexAMETHasone 6 mg tablet  Commonly known as: Decadron  Notes to patient: NOT TAKING     Dulera 100-5 mcg/actuation inhaler  Generic drug: mometasone-formoterol  Medication Adjustments for Surgery: Take on the morning of surgery     DULoxetine 60 mg DR capsule  Commonly known as: Cymbalta  Medication Adjustments for Surgery: Take on the morning of surgery     ferrous sulfate 325 mg (65 mg elemental) tablet  Medication Adjustments for Surgery: Do Not take on the morning of surgery     FLUoxetine 40 mg capsule  Commonly known as: PROzac  Medication Adjustments for Surgery: Take on the morning of surgery     fluticasone 50 mcg/actuation nasal spray  Commonly known as: Flonase  Medication Adjustments for Surgery: Take/Use as prescribed     fluticasone furoate-vilanteroL  100-25 mcg/dose inhaler  Commonly known as: Breo Ellipta  Medication Adjustments for Surgery: Take on the morning of surgery     folic acid 1 mg tablet  Commonly known as: Folvite  Medication Adjustments for Surgery: Do Not take on the morning of surgery     * gabapentin 300 mg capsule  Commonly known as: Neurontin  Take 1 capsule (300 mg) by mouth 3 times a day.  Medication Adjustments for Surgery: Take on the morning of surgery     * gabapentin 100 mg capsule  Commonly known as: Neurontin  Take 1 capsule (100 mg) by mouth 3 times a day.  Medication Adjustments for Surgery: Take on the morning of surgery     Januvia 100 mg tablet  Generic drug: SITagliptin phosphate  Notes to patient: NOT TAKING     JARDIANCE ORAL  Medication Adjustments for Surgery: Take last dose 3 days before surgery  Notes to patient: STOP TAKING 7/27     lidocaine 5 % patch  Commonly known as: Lidoderm  APPLY 1 PATCH TO SKIN AS NEEDED APPLY 12 HOURS ON 12 HOURS OFF  Medication Adjustments for Surgery: Do Not take on the morning of surgery     metFORMIN 1,000 mg tablet  Commonly known as: Glucophage  Medication Adjustments for Surgery: Take last dose 1 day (24 hours) before surgery     metoclopramide 10 mg tablet  Commonly known as: Reglan  Medication Adjustments for Surgery: Take on the morning of surgery     midodrine 2.5 mg tablet  Commonly known as: Proamatine  Medication Adjustments for Surgery: Take on the morning of surgery     montelukast 10 mg tablet  Commonly known as: Singulair  Medication Adjustments for Surgery: Take on the morning of surgery     Mucus Relief  mg 12 hr tablet  Generic drug: guaiFENesin  Medication Adjustments for Surgery: Do Not take on the morning of surgery     omeprazole 40 mg DR capsule  Commonly known as: PriLOSEC  Medication Adjustments for Surgery: Take on the morning of surgery     ondansetron ODT 4 mg disintegrating tablet  Commonly known as: Zofran-ODT  Medication Adjustments for Surgery: Do Not  take on the morning of surgery     OneTouch Delica Plus Lancet 33 gauge misc  Generic drug: lancets  Medication Adjustments for Surgery: Take/Use as prescribed     OneTouch Ultra Test  Generic drug: blood sugar diagnostic  Medication Adjustments for Surgery: Take/Use as prescribed     OneTouch Ultra2 Meter misc  Generic drug: blood-glucose meter  Medication Adjustments for Surgery: Take/Use as prescribed     OptiChamber Blank VHC inhaler  Generic drug: inhalational spacing device  Medication Adjustments for Surgery: Take/Use as prescribed     potassium chloride CR 10 mEq ER tablet  Commonly known as: Klor-Con  Notes to patient: NOT TAKING     Spiriva with HandiHaler 18 mcg inhalation capsule  Generic drug: tiotropium  Medication Adjustments for Surgery: Take on the morning of surgery     topiramate 100 mg tablet  Commonly known as: Topamax  Medication Adjustments for Surgery: Take on the morning of surgery     Trulicity 1.5 mg/0.5 mL pen injector injection  Generic drug: dulaglutide  Medication Adjustments for Surgery: Do Not take on the morning of surgery  Notes to patient: DO NOT TAKE BEFORE SURGERY ON 7/30/25  CLEAR LIQUIDS REQUIRED FOR 24 HRS PRIOR TO SURGERY      Ventolin HFA 90 mcg/actuation inhaler  Generic drug: albuterol  Medication Adjustments for Surgery: Take on the morning of surgery           * This list has 2 medication(s) that are the same as other medications prescribed for you. Read the directions carefully, and ask your doctor or other care provider to review them with you.                  Preoperative Fasting Guidelines      Why must I stop eating and drinking before surgery?   With anesthesia, food or liquid in your stomach can enter your lungs causing serious complications   GLP-1 medications can slow the movement of food through your stomach and intestines.  This further increases the risk of food entering your lungs with anesthesia     When do I need to stop eating and drinking before my  surgery?   To help ensure food has passed out of your stomach, START a clear liquid diet 24 hours before your surgery   On the day of your surgery/procedure, STOP all clear liquids 2 hours before your arrival time to the hospital/facility      A clear liquid diet consists of clear liquids and foods that melt into a clear liquid (i.e. gelatin) and excludes solid foods and liquids you cannot see through (i.e. milk). Clears can and should contain sugar to obtain a sufficient number of calories.  A clear liquid diet includes   Clear, fat-free broth   Clear nutritional drinks   Pulp-free popsicles, vegetable and fruit juice   Gelatin   Coffee and tea without creamer or milk   Clear soda and sports drinks      Diabetic Patients   Clear liquids should not be sugar-free    Check your blood glucose levels as you normally do   If you have symptoms of low blood glucose (shakiness, sweating, dizziness, confusion) or high blood glucose (dry mouth, excessive thirst, frequent urination, blurry vision), check your blood glucose level   For low blood glucose increase your consumption of sugar-containing clear liquid    For high blood glucose, decrease your consumption of sugar-containing clears and treat as you normally would   If symptoms persist seek medical attention      Examples of GLP-1 Medications   Trulicity   Ozempic   Mounjaro   Zepbound   Bydyreon   Tanzeun   Saxenda   Victoza   Adlyxin   Rybelus         CONTACT SURGEON'S OFFICE IF YOU DEVELOP:  * Fever = 100.4 F   * New respiratory symptoms (e.g. cough, shortness of breath, respiratory distress, sore throat)  * Recent loss of taste or smell  *Flu like symptoms such as headache, fatigue or gastrointestinal symptoms  * You develop any open sores, shingles, burning or painful urination   AND/OR:  * You no longer wish to have the surgery.  * Any other personal circumstances change that may lead to the need to cancel or defer this surgery.  *You were admitted to any hospital  within one week of your planned procedure.    SMOKING:  *Quitting smoking can make a huge difference to your health and recovery from surgery.    *If you need help with quitting, call [phone].    SURGICAL TIME:  *You will be contacted between 2 p.m. and 6 p.m. the business day before your surgery with your arrival time.  *If you haven't received a call by 6pm, call [phone].  *Scheduled surgery times may change and you will be notified if this occurs-check your personal voicemail for any updates.    ON THE MORNING OF SURGERY:  *Wear comfortable, loose fitting clothing.   *Do not use moisturizers, creams, lotions or perfume.  *All jewelry and valuables should be left at home.  *Prosthetic devices such as contact lenses, hearing aids, dentures, eyelash extensions, hairpins and body piercing must be removed before surgery.    BRING WITH YOU:  *Photo ID and insurance card  *Current list of medications and allergies  *Pacemaker/Defibrillator/Heart stent cards  *CPAP machine and mask  *Slings/splints/crutches  *Copy of your complete Advanced Directive/DHPOA-if applicable  *Neurostimulator implant remote    PARKING AND ARRIVAL:  *Check in at the Main Entrance desk and let them know you are here for surgery.  *You will be directed to the 2nd floor surgical waiting area.    IF YOU ARE HAVING OUTPATIENT/SAME DAY SURGERY:  *A responsible adult MUST accompany you at the time of discharge and stay with you for 24 hours after your surgery.  *You may NOT drive yourself home after surgery.  *You may use a taxi or ride sharing service ([employer], Uber) to return home ONLY if you are accompanied by a friend or family member.  *Instructions for resuming your medications will be provided by your surgeon.    Home Preoperative Antibacterial Shower     What is a home preoperative antibacterial shower?  This shower is a way of cleaning the skin with a germ killing soap before surgery.  The soap contains chlorhexidine, commonly known  as CHG.  CHG is a soap for your skin with germ killing ability.  Let your doctor know if you are allergic to chlorhexidine.    Why do I need to take a preoperative antibacterial shower?  Skin is not sterile.  It is best to try to make your skin as free of germs as possible before surgery.  Proper cleansing with a germ killing soap before surgery can lower the number of germs on your skin.  This helps to reduce the risk of infection at the surgical site.  Following the instructions listed below will help you prepare your skin for surgery.      How do I use the CHG skin cleanser?  Steps:  Begin using your CHG soap five days before your scheduled surgery on ________________________.    Days 1-4 Shower before bed:  Wash your face and genitals with your normal soap and rinse.           2.    Apply the CHG soap to a clean wet washcloth.  Turn the water off or move away                From the water spray to avoid premature rinsing of the CHG soap as you are applying.     3.   Lather your entire body from the neck down.  Do not use on your face or genitals.  4. Pay special attention to the area(s) where your incision(s) will be located unless they are on your face.  Avoid scrubbing your skin too hard.  The important point is to have the CHG soap sit on your skin for 3 minutes.    When the 3 minutes are up, turn on the water and rinse the CHG soap off your body completely.   Pat yourself dry with a clean, freshly-laundered towel.  Dress in clean, freshly laundered night clothes.    Be sure to change bed sheets and blankets at least on the first night of CHG body wash use. May change linens every night of the above protocol for maximum benefit.   Day 5:  Last shower is the morning of surgery: Follow above Instructions.    NOTE:        *Keep CHG soap out of eyes and ear canals   *DO NOT wash with regular soap on your body after you have used the CHG soap solution  *DO NOT apply powders, lotions, or perfume.  *Deodorant may be  used days 1-4, BUT NOT the day of surgery    Who should I contact if I have any questions regarding the use of CHG soap?  Call the [facility], Preadmission Testing at [phone] if you have any questions.              Patient Information: Pre-Operative Infection Prevention Measures     Why did I have my nose, under my arms and groin swabbed?  The purpose of the swab is to identify Staphylococcus aureus inside your nose or on your skin.  The swab was sent to the laboratory for culture.  A positive swab/culture for Staphylococcus aureus is called colonization or carriage.      What is Staphylococcus aureus?  Staphylococcus aureus, also known as “staph”, is a germ found on the skin or in the nose of healthy people.  Sometimes Staphylococcus aureus can get into the body and cause an infection.  This can be minor (such as pimples, boils or other skin problems).  It might also be serious (such as blood infection, pneumonia or a surgical site infection).    What is Staphylococcus aureus colonization or carriage?  Colonization or carriage means that a person has the germ but is not sick from it.  These bacteria can be spread on the hands or when breathing or sneezing.    How is Staphylococcus aureus spread?  It is most often spread by close contact with a person or item that carries it.    What happens if my culture is positive for Staphylococcus aureus?  Your doctor/medical team will use this information to guide any antibiotic treatment which may be necessary.  Regardless of the culture results, we will clean the inside of your nose with a betadine swab just before you have your surgery.      Will I get an infection if I have Staphylococcus aureus in my nose or on my skin?  Anyone can get an infection with Staphylococcus aureus.  However, the best way to reduce your risk of infection is to follow the instructions provided to you for the use of your CHG soap and dental rinse.        Who  should I contact if I have any questions?  Call the [facility], Preadmission Testing at [phone] if you have any questions.          Patient Information: Oral/Dental Rinse  **This is a prescription; pick it up at your preferred local pharmacy **  What is oral/dental rinse?   It is a mouthwash. It is a way of cleaning the mouth with a germ killing solution before your surgery.  The solution contains chlorhexidine, commonly known as CHG.   It is used inside the mouth to kill a bacteria known as Staphylococcus aureus.  Let your doctor know if you are allergic to Chlorhexidine.    Why do I need to use CHG oral/dental rinse?  The CHG oral/dental rinse helps to kill a bacteria in your mouth known a Staphylococcus aureus.     This reduces the risk of infection at the surgical site.      Using your CHG oral/dental rinse  STEPS:  Use your CHG oral/dental rinse after you brush your teeth the night before (at bedtime) and the morning of your surgery.  Follow all directions on your prescription label.    Use the cap on the container to measure 15ml (fill cap to fill line)  Swish (gargle if you can) the mouthwash in your mouth for at least 30 seconds, (do not to swallow) spit out  After you use your CHG rinse, do not rinse your mouth with water, drink or eat.  Please refer to prescription label for the appropriate time to resume oral intake  Dental rinse comes in one size bottle: 473ml ~16oz.  You will have leftover    rinse, discard after this use.    What side effects might I have using the CHG oral/dental rinse?  CHG rinse will stick to plaque on the teeth.  Brush and floss just before use.  Teeth brushing will help avoid staining of plaque during use.    Who should I contact if I have questions about the CHG oral/dental rinse?  Please call [facility], Preadmission Testing at [phone] if you have any questions

## 2025-07-23 NOTE — CPM/PAT H&P
CPM/PAT Evaluation       Name: Iraida La (Iraida La)  /Age: 1980/45 y.o.     SURGEON :DR MADIHA WETZEL   Surgery, Date, and Length:  Robot Assisted Laparoscopic Total Hysterectomy; Bilateral Salpingectomy 25    HPI:  This a 45y.o. fe-male who presents for presurgical evaluation for for above mentioned procedure. Pt reports a history of very heavy bleeding and pelvic pain for several months  . After discussion of the risks and benefits with Dr. Wetzel the patient elects to proceed with the planned procedure.       Past Medical History:   Diagnosis Date    Anemia     iron deficiency H&H 12.8/38.8 25    Anxiety     Arthritis     Asthma     triggers heat/dust/reflux    Cervical disc disease     COPD (chronic obstructive pulmonary disease) (Multi)     Depression     Dysmenorrhea     menorrhagia    Fibromyalgia     pain infusions, ketamine, lidocaine, propofol, zofran    GERD (gastroesophageal reflux disease)     Hyperlipidemia     Hypertension     Liver disease     metabolic dysfuncton steatotic liver disease AST 11, ALT 53, Total Barrington <0.2 25    Sleep apnea     CPAP    Type 2 diabetes mellitus     25 5.7 A1C       Past Surgical History:  Procedure Laterality Date    SALPINGOOPHORECTOMY       Anesthesia History  Pt denies any past history of anesthetic complications such as PONV, awareness, prolonged sedation, dental damage, aspiration, cardiac arrest, difficult intubation, difficult I.V. access or unexpected hospital admissions.  NO malignant hyperthermia and or pseudo cholinesterase deficiency.    The patient is not  a Faith and will accept blood and blood products if medically indicated.   No history of blood transfusions .Type and screen  sent.     Social History  Social History     Substance and Sexual Activity   Drug Use Not Currently      Social History     Substance and Sexual Activity   Alcohol Use Not Currently    Alcohol/week: 1.0 standard drink of alcohol     Types: 1 Glasses of wine per week    Comment: social      Social History     Tobacco Use   Smoking Status Some Days    Current packs/day: 0.25    Types: Cigarettes    Passive exposure: Past   Smokeless Tobacco Never   Tobacco Comments    She was using vaping but then quit this about a month.     Patient notes she is still smoking but not on a consistent basis.  Discussed cessation.  Patient will think about it.  Discussed medication or counseling.  5-minute discussion          Family History  Problem Relation Name Age of Onset    COPD Mother      Heart disease Mother      Cancer Father      Heart disease Father      Diabetes Sister      No Known Problems Sister      No Known Problems Sister      No Known Problems Sister      Accidental death Brother         Allergies   Allergen Reactions    Aspirin Anaphylaxis and Hives    Erythromycin Anaphylaxis    Salicylates Anaphylaxis, Cough, Hives, Rash and Shortness of breath    Erythromycin Base Nausea/vomiting    Peanut Swelling    Toradol [Ketorolac] Hives and Itching       Prior to Admission medications   Medication Sig Start Date End Date Taking? Authorizing Provider   acetaminophen (Tylenol) 500 mg tablet TAKE 1 TABLET BY MOUTH EVERY 4 HOURS AS NEEDED FOR PAIN OR FEVER (SPECIFY). 8/27/23  Yes Historical Provider, MD   ascorbic acid (Vitamin C) 250 mg tablet Take 1 tablet (250 mg) by mouth once daily. 9/21/23  Yes Historical Provider, MD   carvedilol (Coreg) 12.5 mg tablet 1 tab(s) orally 2 times a day   Yes Historical Provider, MD   cholecalciferol (Vitamin D-3) 1,250 mcg (50,000 unit) capsule TAKE ONE (1) CAPSULE BY MOUTH EVERY SATURDAY   Yes Historical Provider, MD   cyanocobalamin, vitamin B-12, 1,000 mcg tablet, sublingual Take 1 tablet (1,000 mcg) by mouth once daily. 11/28/23  Yes Junie Tobar MD PhD   cyclobenzaprine (Flexeril) 10 mg tablet TAKE 1 TABLET BY MOUTH AT BEDTIME AS NEEDED FOR MUSCLE SPASM 4/16/23  Yes Historical Provider, MD   dulaglutide  (Trulicity) 1.5 mg/0.5 mL pen injector injection Inject 4.5 mg under the skin 1 (one) time per week. Every Sunday   Yes Historical Provider, MD   DULoxetine (Cymbalta) 60 mg DR capsule  9/21/23  Yes Historical Provider, MD   empagliflozin (JARDIANCE ORAL) Take 20 mg by mouth 1 time.   Yes Historical Provider, MD   ferrous sulfate 325 (65 Fe) MG tablet TAKE 1 TABLET BY MOUTH EVERY DAY WITH BREAKFAST 9/19/23  Yes Historical Provider, MD   FLUoxetine (PROzac) 40 mg capsule  9/26/23  Yes Historical Provider, MD   fluticasone (Flonase) 50 mcg/actuation nasal spray 2 sprays by Does not apply route if needed. 4/11/19  Yes Historical Provider, MD   fluticasone furoate-vilanteroL (Breo Ellipta) 100-25 mcg/dose inhaler 1 puff if needed.   Yes Historical Provider, MD   folic acid (Folvite) 1 mg tablet Take 1 tablet (1 mg) by mouth once daily. 9/5/24  Yes Historical Provider, MD   gabapentin (Neurontin) 100 mg capsule Take 1 capsule (100 mg) by mouth 3 times a day. 7/7/25  Yes CHRISTIANO Farnsworth   lidocaine (Lidoderm) 5 % patch APPLY 1 PATCH TO SKIN AS NEEDED APPLY 12 HOURS ON 12 HOURS OFF 9/3/24  Yes CHRISTIANO Farnsworth   metFORMIN (Glucophage) 1,000 mg tablet 2 times a day with meals. 9/26/23  Yes Historical Provider, MD   metoclopramide (Reglan) 10 mg tablet TAKE 1 TABLET BEFORE MEALS TWICE DAILY AS NEEDED 9/8/23  Yes Historical Provider, MD   mometasone-formoterol (Dulera) 100-5 mcg/actuation inhaler Inhale 1 puff if needed. 10/13/24  Yes Historical Provider, MD   montelukast (Singulair) 10 mg tablet Take 1 tablet (10 mg) by mouth once daily at bedtime. 11/5/19  Yes Historical Provider, MD   Mucus Relief  mg 12 hr tablet Take 1 tablet (600 mg) by mouth if needed.   Yes Historical Provider, MD   omeprazole (PriLOSEC) 40 mg DR capsule Take 1 capsule (40 mg) by mouth 2 times a day. 1/9/23  Yes Historical Provider, MD   ondansetron ODT (Zofran-ODT) 4 mg disintegrating tablet Dissolve 1 tablet (4 mg) in the mouth  every 6 hours if needed for nausea or vomiting. 8/27/23  Yes Historical Provider, MD   Spiriva with HandiHaler 18 mcg inhalation capsule INHALE THE CONTENTS OF ONE (1) CAPSULE BY MOUTH VIA HANDIHALER ONCE DAILY AT THE SAME TIME EVERY DAY AS DIRECTED *DO NOT SWALLOW CAPSULE* 9/1/23  Yes Historical Provider, MD   topiramate (Topamax) 100 mg tablet Take 1 tablet (100 mg) by mouth 2 times a day.   Yes Historical Provider, MD   Ventolin HFA 90 mcg/actuation inhaler Inhale 2 puffs every 4 hours if needed.   Yes Historical Provider, MD   chlorhexidine (Peridex) 0.12 % solution Use 15 mL in the mouth or throat once daily for 2 days. 7/23/25 7/25/25  CHRISTIANO Talbot   dexAMETHasone (Decadron) 6 mg tablet Take 1 tablet (6 mg) by mouth once daily.  Patient not taking: Reported on 7/23/2025 8/30/23   Historical Provider, MD   gabapentin (Neurontin) 300 mg capsule Take 1 capsule (300 mg) by mouth 3 times a day. 7/7/25 7/7/26  CHRISTIANO Farnsworth   Januvia 100 mg tablet Take 1 tablet (100 mg) by mouth once daily.  Patient not taking: Reported on 7/23/2025 9/19/23   Historical Provider, MD   midodrine (Proamatine) 2.5 mg tablet Take 1 tablet (2.5 mg) by mouth 2 times a day.  Patient not taking: Reported on 7/23/2025    Historical Provider, MD   OneTouch Delica Plus Lancet 33 gauge misc USE TO TEST TWICE DAILY AS NEEDED 9/8/23   Historical Provider, MD   OneTouch Ultra Test strip  9/8/23   Historical Provider, MD   OneTouch Ultra2 Meter misc  9/8/23   Historical Provider, MD Maye Parson [facility] inhaler USE WITH INHALER AS NEEDED 10/10/22   Historical Provider, MD   potassium chloride CR 10 mEq ER tablet Take 1 tablet (10 mEq) by mouth once daily.  Patient not taking: Reported on 7/23/2025 12/9/22   Historical Provider, MD ELLIS ROS:   Constitutional:   neg    Neuro/Psych:    agitation  Eyes:   Ears:   Nose:    nasal discharge  Mouth:   neg    Throat:   neg    Neck:   neg    Cardio:   neg    Respiratory:  "  neg    Endocrine:   GI:    nausea (trulicity)  :   neg    Musculoskeletal:    arthralgias (generalized)  Hematologic:    bruises/bleeds easily  Skin:  neg        Physical Exam  Vitals reviewed.   Constitutional:       Appearance: She is obese.   HENT:      Head: Normocephalic.      Mouth/Throat:      Mouth: Mucous membranes are moist.     Eyes:      Extraocular Movements: Extraocular movements intact.      Pupils: Pupils are equal, round, and reactive to light.       Cardiovascular:      Rate and Rhythm: Normal rate and regular rhythm.      Pulses: Normal pulses.      Heart sounds: Normal heart sounds.   Pulmonary:      Effort: Pulmonary effort is normal.      Breath sounds: Normal breath sounds.     Musculoskeletal:      Cervical back: Normal range of motion.     Skin:     Findings: Rash (rt and lt flanks papular) present.     Neurological:      Mental Status: She is alert.          PAT AIRWAY:   Airway:     Mallampati::  III      Testing/Diagnostic:   EKG IN EPIC     DDI VIBRATION TRANSIENT ELASTOGRAPHY 1/17/25  Impression   The liver stiffness is  10.6 kPa which corresponds to 56% chance of stage 0-2 fibrosis. The CAP analysis showed grade S3 of liver steatosis.     Lab Results   Component Value Date    WBC 7.6 07/23/2025    HGB 13.1 07/23/2025    HCT 39.5 07/23/2025    MCV 89 07/23/2025     07/23/2025     Lab Results   Component Value Date    GLUCOSE 89 07/23/2025    CALCIUM 9.7 07/23/2025     07/23/2025    K 4.4 07/23/2025    CO2 21 07/23/2025     07/23/2025    BUN 17 07/23/2025    CREATININE 0.70 07/23/2025     Lab Results   Component Value Date    ALT 11 07/23/2025    AST 10 07/23/2025    ALKPHOS 53 07/23/2025    BILITOT 0.2 07/23/2025     Lab Results   Component Value Date    INR 1.0 07/23/2025    PROTIME 10.6 07/23/2025         /74   Pulse 88   Temp 37 °C (98.6 °F)   Resp 16   Ht 1.67 m (5' 5.75\")   Wt 92 kg (202 lb 13.2 oz)   LMP 06/12/2023   SpO2 97%   BMI 32.99 kg/m² "     ASSESSMENT/PLAN    Patient is a 45year-old  scheduled for Robot Assisted Laparoscopic Total Hysterectomy; Bilateral Salpingectomy  with Dr. PINTO  on  7/30/25 .  CARDIOVASCULAR:  RCRI score / Risk: The patients score is 0 based on history . Per ACC/AHA guidelines this places her  at  3.9% risk for MACE undergoing a intermediate  risk procedure . The patient has the following risk factors: denies   Functional Capacity: The patients exercise tolerance is  4  METS. This is based on the patient's. Patient reports intermittant chest pain .    Based on today's subjective reporting, diagnosis of long term DM and smoking .We are asking for cardiac consultation for risk assessment and recommendations regarding cardiac optimization.    PULMONARY:  The patient has the following factors that place them at increased risk of perioperative pulmonary complications;age greater than 65/BMI greater than 27/asthma/copd/gerd /greater than 2.5 hour procedure.  Postoperatively the patient would benefit from early pulmonary toilet/incentive spirometry q 1-2 hours while awake/pulse oximetry/cautious use of respiratory depressant medications such as opioids/elevate the HOB/oral hygiene.    DM2:  The patient has  a 12 year history of  diabetes.Currently the patient manages their diabetes with oral agents/GLP-1 her  recent A1C was pending  on7/23/25.  The morning of surgery, the patient  was told to hold her GLP1 and hold Jardiance 3 days and Metformin 24 hrs before surgery ..  The night before surgery the patient was instructed to administer  half their long acting insulin.    Metabolic dysfunction associated steatotic liver   disease. :  FIBROSCAN :STAGE 0-2  MELD SCORE :8 points <2%    DVT:  CAPRINI SCORE=8  The patient has the following factors that increase her  Risk for thrombus formation ; Virchow's triad ,age 35, bmi 34 , Surgical procedure >2 hrs  procedure .    Recommendations: DVT prophylaxis  per Dr. Pinto  protocol . SCD's,  PORFIRIO's, and early ambulation are recommended. Heparin or LMWH is recommended for the very high risk .      Risk assessment complete.  Patient is scheduled for  intermediate  surgical risk procedure.  Patient is considered a intermediate risk pending cardiac evaluation  risk to proceed with the planned procedure.      Preoperative medication instructions were provided and reviewed with the patient.  Any additional testing or evaluation was explained to the patient.  Nothing by mouth instructions were discussed and patient's questions were answered prior to conclusion to this encounter.  Patient verbalized understanding of preoperative instructions given in preadmission testing; discharge instructions available in EMR.

## 2025-07-23 NOTE — CPM/PAT NURSE NOTE
CPM/PAT Nurse Note      Name: Iraida La (Iraida La)  /Age: 1980/45 y.o.       Medical History[1]    Surgical History[2]    Patient  reports being sexually active and has had partner(s) who are male. She reports using the following method of birth control/protection: None.    Family History[3]    Allergies[4]    Prior to Admission medications   Medication Sig Start Date End Date Taking? Authorizing Provider   acetaminophen (Tylenol) 500 mg tablet TAKE 1 TABLET BY MOUTH EVERY 4 HOURS AS NEEDED FOR PAIN OR FEVER (SPECIFY). 23  Yes Historical Provider, MD   ascorbic acid (Vitamin C) 250 mg tablet Take 1 tablet (250 mg) by mouth once daily. 23  Yes Historical Provider, MD   carvedilol (Coreg) 12.5 mg tablet 1 tab(s) orally 2 times a day   Yes Historical Provider, MD   cholecalciferol (Vitamin D-3) 1,250 mcg (50,000 unit) capsule TAKE ONE (1) CAPSULE BY MOUTH EVERY SATURDAY   Yes Historical Provider, MD   cyanocobalamin, vitamin B-12, 1,000 mcg tablet, sublingual Take 1 tablet (1,000 mcg) by mouth once daily. 23  Yes Junie Tobar MD PhD   cyclobenzaprine (Flexeril) 10 mg tablet TAKE 1 TABLET BY MOUTH AT BEDTIME AS NEEDED FOR MUSCLE SPASM 23  Yes Historical Provider, MD   dulaglutide (Trulicity) 1.5 mg/0.5 mL pen injector injection Inject 4.5 mg under the skin 1 (one) time per week. Every    Yes Historical Provider, MD   DULoxetine (Cymbalta) 60 mg DR capsule  23  Yes Historical Provider, MD   empagliflozin (JARDIANCE ORAL) Take 20 mg by mouth 1 time.   Yes Historical Provider, MD   ferrous sulfate 325 (65 Fe) MG tablet TAKE 1 TABLET BY MOUTH EVERY DAY WITH BREAKFAST 23  Yes Historical Provider, MD   FLUoxetine (PROzac) 40 mg capsule  23  Yes Historical Provider, MD   fluticasone (Flonase) 50 mcg/actuation nasal spray 2 sprays by Does not apply route if needed. 19  Yes Historical Provider, MD   fluticasone furoate-vilanteroL (Breo Ellipta) 100-25  mcg/dose inhaler 1 puff if needed.   Yes Historical Provider, MD   folic acid (Folvite) 1 mg tablet Take 1 tablet (1 mg) by mouth once daily. 9/5/24  Yes Historical Provider, MD   gabapentin (Neurontin) 100 mg capsule Take 1 capsule (100 mg) by mouth 3 times a day. 7/7/25  Yes CHRISTIANO Farnsworth   lidocaine (Lidoderm) 5 % patch APPLY 1 PATCH TO SKIN AS NEEDED APPLY 12 HOURS ON 12 HOURS OFF 9/3/24  Yes CHRISTIANO Farnsworth   metFORMIN (Glucophage) 1,000 mg tablet 2 times a day with meals. 9/26/23  Yes Historical Provider, MD   metoclopramide (Reglan) 10 mg tablet TAKE 1 TABLET BEFORE MEALS TWICE DAILY AS NEEDED 9/8/23  Yes Historical Provider, MD   mometasone-formoterol (Dulera) 100-5 mcg/actuation inhaler Inhale 1 puff if needed. 10/13/24  Yes Historical Provider, MD   montelukast (Singulair) 10 mg tablet Take 1 tablet (10 mg) by mouth once daily at bedtime. 11/5/19  Yes Historical Provider, MD   Mucus Relief  mg 12 hr tablet Take 1 tablet (600 mg) by mouth if needed.   Yes Historical Provider, MD   omeprazole (PriLOSEC) 40 mg DR capsule Take 1 capsule (40 mg) by mouth 2 times a day. 1/9/23  Yes Historical Provider, MD   ondansetron ODT (Zofran-ODT) 4 mg disintegrating tablet Dissolve 1 tablet (4 mg) in the mouth every 6 hours if needed for nausea or vomiting. 8/27/23  Yes Historical Provider, MD   Spiriva with HandiHaler 18 mcg inhalation capsule INHALE THE CONTENTS OF ONE (1) CAPSULE BY MOUTH VIA HANDIHALER ONCE DAILY AT THE SAME TIME EVERY DAY AS DIRECTED *DO NOT SWALLOW CAPSULE* 9/1/23  Yes Historical Provider, MD   topiramate (Topamax) 100 mg tablet Take 1 tablet (100 mg) by mouth 2 times a day.   Yes Historical Provider, MD   Ventolin HFA 90 mcg/actuation inhaler Inhale 2 puffs every 4 hours if needed.   Yes Historical Provider, MD   chlorhexidine (Peridex) 0.12 % solution Use 15 mL in the mouth or throat once daily for 2 days. 7/23/25 7/25/25  Cindy Maldonado, JO-CNP   dexAMETHasone (Decadron)  6 mg tablet Take 1 tablet (6 mg) by mouth once daily.  Patient not taking: Reported on 7/23/2025 8/30/23   Historical Provider, MD   gabapentin (Neurontin) 300 mg capsule Take 1 capsule (300 mg) by mouth 3 times a day. 7/7/25 7/7/26  Jefferson Mckee APRN-CNP   Januvia 100 mg tablet Take 1 tablet (100 mg) by mouth once daily.  Patient not taking: Reported on 7/23/2025 9/19/23   Historical Provider, MD   midodrine (Proamatine) 2.5 mg tablet Take 1 tablet (2.5 mg) by mouth 2 times a day.  Patient not taking: Reported on 7/23/2025    Historical Provider, MD   OneTouch Delica Plus Lancet 33 gauge misc USE TO TEST TWICE DAILY AS NEEDED 9/8/23   Historical Provider, MD   OneTouch Ultra Test strip  9/8/23   Historical Provider, MD   OneTouch Ultra2 Meter misc  9/8/23   Historical Provider, MD Maye Parson [facility] inhaler USE WITH INHALER AS NEEDED 10/10/22   Historical Provider, MD   potassium chloride CR 10 mEq ER tablet Take 1 tablet (10 mEq) by mouth once daily.  Patient not taking: Reported on 7/23/2025 12/9/22   Historical Provider, MD RHONDA SANDY     DASMYRIAM Risk Score    No data to display  Caprini DVT Assessment    No data to display  Modified Frailty Index    No data to display  [id number] Stroke Risk Points  Current as of just now        N/A 0 to 9 Points:      Last Change: N/A          The [id number] risk score (Lip JAIME, et al. 2009. © 2010 [employer]) quantifies the risk of stroke for a patient with atrial fibrillation. For patients without atrial fibrillation or under the age of 18 this score appears as N/A. Higher score values generally indicate higher risk of stroke.        This score is not applicable to this patient. Components are not calculated.          Revised Cardiac Risk Index    No data to display  Apfel Simplified Score    No data to display  Risk Analysis Index Results This Encounter    No data found in the last 10 encounters.       Prodigy: High Risk  Total Score: 0           ARISCAT Score for Postoperative Pulmonary Complications    No data to display  Jacobs Perioperative Risk for Myocardial Infarction or Cardiac Arrest (WILFRIDO)    No data to display      Nurse Plan of Action:     After Visit Summary (AVS) reviewed and patient verbalized good understanding of medications and NPO instructions.  Pre-op infection prevention measures:  CHG showers and mouthwash reviewed, understanding voiced.  CHG soap given and patient verbalized need to pick CHG mouthwash at their preferred local pharmacy.              [1]   Past Medical History:  Diagnosis Date    Anemia     iron deficiency H&H 12.8/38.8 4/12/25    Anxiety     Arthritis     Asthma     triggers heat/dust/reflux    Cervical disc disease     COPD (chronic obstructive pulmonary disease) (Multi)     Depression     Dysmenorrhea     menorrhagia    Fibromyalgia     pain infusions, ketamine, lidocaine, propofol, zofran    GERD (gastroesophageal reflux disease)     Hyperlipidemia     Hypertension     Liver disease     metabolic dysfuncton steatotic liver disease AST 11, ALT 53, Total Barrington <0.2 4/12/25    Sleep apnea     CPAP    Type 2 diabetes mellitus     7/1/25 5.7 A1C   [2]   Past Surgical History:  Procedure Laterality Date    SALPINGOOPHORECTOMY     [3]   Family History  Problem Relation Name Age of Onset    COPD Mother      Heart disease Mother      Cancer Father      Heart disease Father      Diabetes Sister      No Known Problems Sister      No Known Problems Sister      No Known Problems Sister      Accidental death Brother     [4]   Allergies  Allergen Reactions    Aspirin Anaphylaxis and Hives    Erythromycin Anaphylaxis    Salicylates Anaphylaxis, Cough, Hives, Rash and Shortness of breath    Erythromycin Base Nausea/vomiting    Peanut Swelling    Toradol [Ketorolac] Hives and Itching

## 2025-07-24 DIAGNOSIS — M79.7 FIBROMYALGIA: Primary | ICD-10-CM

## 2025-07-24 LAB
ATRIAL RATE: 89 BPM
P AXIS: 42 DEGREES
P OFFSET: 194 MS
P ONSET: 142 MS
PR INTERVAL: 166 MS
Q ONSET: 225 MS
QRS COUNT: 15 BEATS
QRS DURATION: 80 MS
QT INTERVAL: 356 MS
QTC CALCULATION(BAZETT): 433 MS
QTC FREDERICIA: 405 MS
R AXIS: 44 DEGREES
T AXIS: 49 DEGREES
T OFFSET: 403 MS
VENTRICULAR RATE: 89 BPM

## 2025-07-25 LAB — STAPHYLOCOCCUS SPEC CULT: NORMAL

## 2025-07-29 ENCOUNTER — OFFICE VISIT (OUTPATIENT)
Dept: CARDIOLOGY | Facility: CLINIC | Age: 45
End: 2025-07-29
Payer: COMMERCIAL

## 2025-07-29 ENCOUNTER — APPOINTMENT (OUTPATIENT)
Dept: INFUSION THERAPY | Facility: CLINIC | Age: 45
End: 2025-07-29
Payer: COMMERCIAL

## 2025-07-29 VITALS
HEIGHT: 66 IN | WEIGHT: 202 LBS | SYSTOLIC BLOOD PRESSURE: 114 MMHG | DIASTOLIC BLOOD PRESSURE: 68 MMHG | HEART RATE: 82 BPM | OXYGEN SATURATION: 99 % | BODY MASS INDEX: 32.47 KG/M2

## 2025-07-29 DIAGNOSIS — Z01.810 PREOPERATIVE CARDIOVASCULAR EXAMINATION: Primary | ICD-10-CM

## 2025-07-29 DIAGNOSIS — E11.9 TYPE 2 DIABETES MELLITUS WITHOUT COMPLICATION, UNSPECIFIED WHETHER LONG TERM INSULIN USE: ICD-10-CM

## 2025-07-29 DIAGNOSIS — E78.5 HYPERLIPIDEMIA, UNSPECIFIED HYPERLIPIDEMIA TYPE: ICD-10-CM

## 2025-07-29 DIAGNOSIS — G47.33 OSA (OBSTRUCTIVE SLEEP APNEA): ICD-10-CM

## 2025-07-29 DIAGNOSIS — R07.89 OTHER CHEST PAIN: ICD-10-CM

## 2025-07-29 PROCEDURE — 99202 OFFICE O/P NEW SF 15 MIN: CPT

## 2025-07-29 PROCEDURE — 3074F SYST BP LT 130 MM HG: CPT

## 2025-07-29 PROCEDURE — 3008F BODY MASS INDEX DOCD: CPT

## 2025-07-29 PROCEDURE — 99212 OFFICE O/P EST SF 10 MIN: CPT

## 2025-07-29 PROCEDURE — 3078F DIAST BP <80 MM HG: CPT

## 2025-07-29 PROCEDURE — 99244 OFF/OP CNSLTJ NEW/EST MOD 40: CPT

## 2025-07-29 RX ORDER — EPINEPHRINE 0.3 MG/.3ML
0.3 INJECTION SUBCUTANEOUS
COMMUNITY
Start: 2024-09-13

## 2025-07-29 RX ORDER — METOPROLOL TARTRATE 100 MG/1
TABLET ORAL
Qty: 1 TABLET | Refills: 0 | Status: SHIPPED | OUTPATIENT
Start: 2025-07-29

## 2025-07-29 ASSESSMENT — ENCOUNTER SYMPTOMS
SHORTNESS OF BREATH: 1
DIZZINESS: 1
BACK PAIN: 1

## 2025-07-29 NOTE — PROGRESS NOTES
Referred by Dr. Mckee for New Patient Visit and Pre-op Clearance    CHIEF COMPLAINT:     Pre-op clearance.    History Of Present Illness:    Iraida La is a 45 y.o. female presenting for Pre-op cardiac risk stratification for Robot Assisted Laparoscopic Total Hysterectomy; Bilateral Salpingectomy 7/30/25 . She has a PMH of HTN, Type 2 Diabetes, > 20 pack a year smoker, DLD, familial CAD and COPD.     Patient reports chest pain and SOB with exertion and at rest. Pain is located in midsternum and does not radiate. It is happen couple time in the day and it last for couple of minutes. Pain is aggravated with activity and stress and breathing make it better sometimes. Pain is characterized as tightness in the chest. She is active working. She report dizziness, and feeling not well. She denies lower extremity edema. No fever. No cough.     Review of Systems   Constitutional: Positive for malaise/fatigue.   Cardiovascular:  Positive for chest pain.   Respiratory:  Positive for shortness of breath.    Musculoskeletal:  Positive for back pain.   Neurological:  Positive for dizziness.   All other systems reviewed and are negative.           Past Medical History:  She has a past medical history of Anemia, Anxiety, Arthritis, Asthma, Cervical disc disease, COPD (chronic obstructive pulmonary disease) (Multi), Depression, Dysmenorrhea, Fibromyalgia, GERD (gastroesophageal reflux disease), Hyperlipidemia, Hypertension, Liver disease, Sleep apnea, and Type 2 diabetes mellitus.    Past Surgical History:  She has a past surgical history that includes Salpingoophorectomy.      Social History:  She reports that she has been smoking cigarettes. She has been exposed to tobacco smoke. She has never used smokeless tobacco. She reports that she does not currently use alcohol after a past usage of about 1.0 standard drink of alcohol per week. She reports that she does not currently use drugs.    Family History:  Family History[1]      Allergies:  Aspirin, Erythromycin, Salicylates, Erythromycin base, Peanut, and Toradol [ketorolac]    Outpatient Medications:  Current Outpatient Medications   Medication Instructions    acetaminophen (Tylenol) 500 mg tablet TAKE 1 TABLET BY MOUTH EVERY 4 HOURS AS NEEDED FOR PAIN OR FEVER (SPECIFY).    ascorbic acid (VITAMIN C) 250 mg, Daily    carvedilol (Coreg) 12.5 mg tablet 1 tab(s) orally 2 times a day    cholecalciferol (Vitamin D-3) 1,250 mcg (50,000 unit) capsule TAKE ONE (1) CAPSULE BY MOUTH EVERY SATURDAY    cyanocobalamin (vitamin B-12) 1,000 mcg, oral, Daily    cyclobenzaprine (Flexeril) 10 mg tablet TAKE 1 TABLET BY MOUTH AT BEDTIME AS NEEDED FOR MUSCLE SPASM    dexAMETHasone (DECADRON) 6 mg, Daily    DULoxetine (Cymbalta) 60 mg DR capsule     empagliflozin (JARDIANCE ORAL) 20 mg, Once    EPINEPHrine (EPIPEN) 0.3 mg    ferrous sulfate 325 (65 Fe) MG tablet TAKE 1 TABLET BY MOUTH EVERY DAY WITH BREAKFAST    FLUoxetine (PROzac) 40 mg capsule     fluticasone (Flonase) 50 mcg/actuation nasal spray 2 sprays, As needed    fluticasone furoate-vilanteroL (Breo Ellipta) 100-25 mcg/dose inhaler 1 puff if needed.    folic acid (FOLVITE) 1 mg, Daily RT    gabapentin (NEURONTIN) 300 mg, oral, 3 times daily    gabapentin (NEURONTIN) 100 mg, oral, 3 times daily    Januvia 100 mg, Daily    lidocaine (Lidoderm) 5 % patch APPLY 1 PATCH TO SKIN AS NEEDED APPLY 12 HOURS ON 12 HOURS OFF    metFORMIN (Glucophage) 1,000 mg tablet 2 times a day with meals.    metoclopramide (Reglan) 10 mg tablet TAKE 1 TABLET BEFORE MEALS TWICE DAILY AS NEEDED    midodrine (PROAMATINE) 2.5 mg, 2 times daily    mometasone-formoterol (Dulera) 100-5 mcg/actuation inhaler 1 puff, As needed    montelukast (Singulair) 10 mg tablet 1 tablet, Nightly    Mucus Relief  mg, As needed    omeprazole (PRILOSEC) 40 mg, 2 times daily    ondansetron ODT (ZOFRAN-ODT) 4 mg, Every 6 hours PRN    OneTouch Delica Plus Lancet 33 gauge misc USE TO TEST TWICE  "DAILY AS NEEDED    OneTouch Ultra Test strip     OneTouch Ultra2 Meter misc     OptiChamber Blank [facility] inhaler USE WITH INHALER AS NEEDED    potassium chloride CR 10 mEq ER tablet 10 mEq, Daily    Spiriva with HandiHaler 18 mcg inhalation capsule INHALE THE CONTENTS OF ONE (1) CAPSULE BY MOUTH VIA HANDIHALER ONCE DAILY AT THE SAME TIME EVERY DAY AS DIRECTED *DO NOT SWALLOW CAPSULE*    topiramate (TOPAMAX) 100 mg, 2 times daily    Trulicity 4.5 mg, Once Weekly    Ventolin HFA 90 mcg/actuation inhaler 2 puffs, Every 4 hours PRN        Last Recorded Vitals:  Vitals:    07/29/25 0911   BP: 114/68   Pulse: 82   SpO2: 99%   Weight: 91.6 kg (202 lb)   Height: 1.676 m (5' 6\")       Physical Exam  Constitutional:       Appearance: Normal appearance.     Cardiovascular:      Rate and Rhythm: Normal rate and regular rhythm.   Pulmonary:      Effort: Pulmonary effort is normal.      Breath sounds: Normal breath sounds.   Abdominal:      General: Abdomen is flat.      Palpations: Abdomen is soft.     Musculoskeletal:         General: Normal range of motion.      Right lower leg: No edema.      Left lower leg: No edema.     Neurological:      Mental Status: She is alert and oriented to person, place, and time.     Psychiatric:         Mood and Affect: Mood normal.         Behavior: Behavior normal.             Last Labs:  CBC -  Lab Results   Component Value Date    WBC 7.6 07/23/2025    HGB 13.1 07/23/2025    HCT 39.5 07/23/2025    MCV 89 07/23/2025     07/23/2025       CMP -  Lab Results   Component Value Date    CALCIUM 9.7 07/23/2025    PROT 7.0 07/23/2025    ALBUMIN 4.1 07/23/2025    AST 10 07/23/2025    ALT 11 07/23/2025    ALKPHOS 53 07/23/2025    BILITOT 0.2 07/23/2025       LIPID PANEL -   No results found for: \"CHOL\", \"TRIG\", \"HDL\", \"CHHDL\", \"LDLF\", \"VLDL\", \"NHDL\"    RENAL FUNCTION PANEL -   Lab Results   Component Value Date    GLUCOSE 89 07/23/2025     07/23/2025    K 4.4 07/23/2025     07/23/2025 "    CO2 21 07/23/2025    ANIONGAP 14 07/23/2025    BUN 17 07/23/2025    CREATININE 0.70 07/23/2025    CALCIUM 9.7 07/23/2025    ALBUMIN 4.1 07/23/2025        Lab Results   Component Value Date    HGBA1C 5.7 (H) 07/23/2025       Last Cardiology Tests:  ECG:  ECG 12 Lead 07/23/2025        Assessment/Plan   Iraida La is a 45 y.o. female presenting for pre-op clearance. Patient report chest pain and SOB with and without exertion. Pain is located in midsternum and not radiate. It is happen couple time in the day and it will last for couple of minutes. Pain is aggravate with activity and stress and breathing make it better sometimes. Pain is characterized as tightness in the chest. She is active working. She report dizziness, and feeling not well.  I personally reviewed the patients recent labs, medications, orders, EKGs, and pertinent cardiac imaging/ echocardiography and ischemic evaluations including stress testing/ cardiac catheterization.    #Pre-op clearance and Chest pain: Patient report chest pain and SOB with and without exertion. Pain is located in midsternum and non-radiating. It occurs periodically in the day and it will last for couple of minutes. CP aggravated with activity and stress and breathing make it better sometimes. Pain is characterized as tightness in the chest. The patient's overall symptoms and risk factors are concerning for possible obstructive coronary artery disease. Multiple options were discussed with the patient.  It was decided to proceed with CT Angiography with Heart flow.    -Metaprolol 100 mg tab 3 hours prior the CTA with heart flow.     #DLD: not at goal, patient not on statin medication.     #Type 2 DM: Hgb A1c at goal, controled by metformin 1000 mg tab daily.      #HTN: normotensive.    #COPD: Asymptomatic     #IVAN:  CPAP compliant    Follow up in 3 weeks GRAEME Estrada       [1]   Family History  Problem Relation Name Age of Onset    COPD Mother       Heart disease Mother      Cancer Father      Heart disease Father      Diabetes Sister      No Known Problems Sister      No Known Problems Sister      No Known Problems Sister      Accidental death Brother

## 2025-07-29 NOTE — PATIENT INSTRUCTIONS
Miss Incortarshaa,     I recommend a CT Angio with Heart Flow which is a CT scan that is a non-invasive procedure which uses Heart Flow analysis. We will be able to obtain 3 D images of your coronary arteries (arteries that supply blood to your heart) as a way to identify any potential blockages.    Take Metoprolol Tartrate 100 mg 3 hours prior to CTA with heart flow.     Follow up after testing GRAEME Chacon

## 2025-08-07 DIAGNOSIS — R07.89 OTHER CHEST PAIN: ICD-10-CM

## 2025-08-07 DIAGNOSIS — Z01.810 PREOPERATIVE CARDIOVASCULAR EXAMINATION: Primary | ICD-10-CM

## 2025-08-07 NOTE — PROGRESS NOTES
Patient is pre-op and has been having chest pain. CT Angiogram denied by insurance. Will have her obtain a Stress Echocardiogram instead.     Eva OSORIO NP-C

## 2025-08-12 ENCOUNTER — HOSPITAL ENCOUNTER (OUTPATIENT)
Dept: CARDIOLOGY | Facility: HOSPITAL | Age: 45
Discharge: HOME | End: 2025-08-12
Payer: COMMERCIAL

## 2025-08-12 DIAGNOSIS — Z01.810 PREOPERATIVE CARDIOVASCULAR EXAMINATION: ICD-10-CM

## 2025-08-12 DIAGNOSIS — R07.9 CHEST PAIN, UNSPECIFIED: ICD-10-CM

## 2025-08-12 DIAGNOSIS — R07.89 OTHER CHEST PAIN: ICD-10-CM

## 2025-08-12 PROCEDURE — 93016 CV STRESS TEST SUPVJ ONLY: CPT | Performed by: INTERNAL MEDICINE

## 2025-08-12 PROCEDURE — 93017 CV STRESS TEST TRACING ONLY: CPT

## 2025-08-12 PROCEDURE — 93018 CV STRESS TEST I&R ONLY: CPT | Performed by: INTERNAL MEDICINE

## 2025-08-12 PROCEDURE — 93350 STRESS TTE ONLY: CPT | Performed by: INTERNAL MEDICINE

## 2025-08-14 ENCOUNTER — HOSPITAL ENCOUNTER (OUTPATIENT)
Dept: RADIOLOGY | Facility: HOSPITAL | Age: 45
End: 2025-08-14
Payer: COMMERCIAL

## 2025-08-19 ENCOUNTER — INFUSION (OUTPATIENT)
Dept: INFUSION THERAPY | Facility: CLINIC | Age: 45
End: 2025-08-19
Payer: COMMERCIAL

## 2025-08-19 ENCOUNTER — APPOINTMENT (OUTPATIENT)
Dept: CARDIOLOGY | Facility: CLINIC | Age: 45
End: 2025-08-19
Payer: COMMERCIAL

## 2025-08-19 VITALS
RESPIRATION RATE: 14 BRPM | DIASTOLIC BLOOD PRESSURE: 68 MMHG | TEMPERATURE: 98.1 F | SYSTOLIC BLOOD PRESSURE: 116 MMHG | HEART RATE: 76 BPM | OXYGEN SATURATION: 97 %

## 2025-08-19 DIAGNOSIS — M79.7 FIBROMYALGIA: Primary | ICD-10-CM

## 2025-08-19 PROCEDURE — 2500000004 HC RX 250 GENERAL PHARMACY W/ HCPCS (ALT 636 FOR OP/ED): Performed by: NURSE PRACTITIONER

## 2025-08-19 PROCEDURE — 96365 THER/PROPH/DIAG IV INF INIT: CPT | Mod: INF

## 2025-08-19 PROCEDURE — 96368 THER/DIAG CONCURRENT INF: CPT | Mod: INF

## 2025-08-19 PROCEDURE — 96375 TX/PRO/DX INJ NEW DRUG ADDON: CPT | Mod: INF

## 2025-08-19 RX ORDER — EPINEPHRINE 0.3 MG/.3ML
0.3 INJECTION SUBCUTANEOUS EVERY 5 MIN PRN
OUTPATIENT
Start: 2025-09-09

## 2025-08-19 RX ORDER — METOCLOPRAMIDE HYDROCHLORIDE 5 MG/ML
10 INJECTION INTRAMUSCULAR; INTRAVENOUS ONCE
OUTPATIENT
Start: 2025-09-09 | End: 2025-09-09

## 2025-08-19 RX ORDER — FAMOTIDINE 10 MG/ML
20 INJECTION, SOLUTION INTRAVENOUS ONCE AS NEEDED
OUTPATIENT
Start: 2025-09-09

## 2025-08-19 RX ORDER — DIPHENHYDRAMINE HYDROCHLORIDE 50 MG/ML
25 INJECTION, SOLUTION INTRAMUSCULAR; INTRAVENOUS ONCE
OUTPATIENT
Start: 2025-09-09 | End: 2025-09-09

## 2025-08-19 RX ORDER — METOPROLOL TARTRATE 25 MG/1
25 TABLET, FILM COATED ORAL ONCE
OUTPATIENT
Start: 2025-09-09 | End: 2025-09-09

## 2025-08-19 RX ORDER — NITROGLYCERIN 0.4 MG/1
0.4 TABLET SUBLINGUAL ONCE
OUTPATIENT
Start: 2025-09-09 | End: 2025-09-09

## 2025-08-19 RX ORDER — DIPHENHYDRAMINE HYDROCHLORIDE 50 MG/ML
50 INJECTION, SOLUTION INTRAMUSCULAR; INTRAVENOUS AS NEEDED
OUTPATIENT
Start: 2025-09-09

## 2025-08-19 RX ORDER — ALBUTEROL SULFATE 0.83 MG/ML
3 SOLUTION RESPIRATORY (INHALATION) AS NEEDED
OUTPATIENT
Start: 2025-09-09

## 2025-08-19 RX ORDER — ONDANSETRON HYDROCHLORIDE 2 MG/ML
4 INJECTION, SOLUTION INTRAVENOUS ONCE
Status: CANCELLED | OUTPATIENT
Start: 2025-09-09 | End: 2025-09-09

## 2025-08-19 RX ORDER — ONDANSETRON HYDROCHLORIDE 2 MG/ML
4 INJECTION, SOLUTION INTRAVENOUS ONCE
Status: COMPLETED | OUTPATIENT
Start: 2025-08-19 | End: 2025-08-19

## 2025-08-19 RX ADMIN — ONDANSETRON 4 MG: 2 INJECTION INTRAMUSCULAR; INTRAVENOUS at 15:20

## 2025-08-19 RX ADMIN — Medication: at 15:21

## 2025-08-19 RX ADMIN — PROPOFOL 100 MG: 10 INJECTION, EMULSION INTRAVENOUS at 15:20

## 2025-08-19 ASSESSMENT — ENCOUNTER SYMPTOMS
OCCASIONAL FEELINGS OF UNSTEADINESS: 0
LOSS OF SENSATION IN FEET: 1
DEPRESSION: 1

## 2025-08-19 ASSESSMENT — PAIN SCALES - GENERAL
PAINLEVEL_OUTOF10: 10 - WORST POSSIBLE PAIN
PAINLEVEL_OUTOF10: 10-WORST PAIN EVER
PAINLEVEL_OUTOF10: 0 - NO PAIN

## 2025-08-22 ENCOUNTER — TELEPHONE (OUTPATIENT)
Dept: CARDIOLOGY | Facility: CLINIC | Age: 45
End: 2025-08-22
Payer: COMMERCIAL

## 2025-08-22 ENCOUNTER — TELEPHONE (OUTPATIENT)
Dept: OBSTETRICS AND GYNECOLOGY | Facility: CLINIC | Age: 45
End: 2025-08-22
Payer: COMMERCIAL

## 2025-08-25 ENCOUNTER — TELEPHONE (OUTPATIENT)
Dept: CARDIOLOGY | Facility: CLINIC | Age: 45
End: 2025-08-25
Payer: COMMERCIAL

## 2025-08-25 DIAGNOSIS — Z01.818 PRE-OP TESTING: Primary | ICD-10-CM

## 2025-12-08 ENCOUNTER — APPOINTMENT (OUTPATIENT)
Dept: OBSTETRICS AND GYNECOLOGY | Facility: CLINIC | Age: 45
End: 2025-12-08
Payer: COMMERCIAL